# Patient Record
Sex: FEMALE | Race: WHITE | Employment: FULL TIME | URBAN - METROPOLITAN AREA
[De-identification: names, ages, dates, MRNs, and addresses within clinical notes are randomized per-mention and may not be internally consistent; named-entity substitution may affect disease eponyms.]

---

## 2017-01-05 ENCOUNTER — TELEPHONE (OUTPATIENT)
Dept: FAMILY MEDICINE CLINIC | Facility: CLINIC | Age: 32
End: 2017-01-05

## 2017-01-05 DIAGNOSIS — Z11.1 SCREENING-PULMONARY TB: Primary | ICD-10-CM

## 2017-01-05 NOTE — TELEPHONE ENCOUNTER
Patient would like an order to be put in for tb quantiferon gold test. Patient states she needs the order put in ASAP so that she can get it done sometime before the weekend.  Also if it cannot be done within her time frame she would like our office to call

## 2017-02-10 ENCOUNTER — TELEPHONE (OUTPATIENT)
Dept: FAMILY MEDICINE CLINIC | Facility: CLINIC | Age: 32
End: 2017-02-10

## 2017-02-10 NOTE — TELEPHONE ENCOUNTER
In order to complete a Prior Authorization for Nexplanon - we need her current insurance info recorded and a copy of her new insurance card scanned  Msg sent to [employer]

## 2017-02-14 NOTE — TELEPHONE ENCOUNTER
LMOM for pt to call back w/Insurance info # on back of card or fax over card. Need Pre-certification for Nexplanon

## 2017-02-14 NOTE — TELEPHONE ENCOUNTER
Need to call customer Service number on back of insurance card to get Nexplanon pre-certified.    Please get customer service number  Forward to [facility]

## 2017-02-15 NOTE — TELEPHONE ENCOUNTER
LMOM for pt to call back w/Insurance Info # on back of card. Need to get for Pre-Certification for Nexplanon

## 2017-02-22 NOTE — TELEPHONE ENCOUNTER
LMOM for pt to call back and schedule Nexplanon Insertion w/Dr Sandra Yee after 4/88/47 (NO pre-certification needed)

## 2017-02-22 NOTE — TELEPHONE ENCOUNTER
Front Staff  Please assist this patient with scheduling an appointment for Nexplanon insertion with Dr Cally Freeman, please schedule anytime after 2/27/17

## 2017-02-22 NOTE — TELEPHONE ENCOUNTER
[facility] [phone], spoke to Uday rod, stated no pre-certification was needed for Nexplanon or insertion   Nexplanon ordered

## 2017-02-28 ENCOUNTER — OFFICE VISIT (OUTPATIENT)
Dept: FAMILY MEDICINE CLINIC | Facility: CLINIC | Age: 32
End: 2017-02-28

## 2017-02-28 VITALS
DIASTOLIC BLOOD PRESSURE: 76 MMHG | RESPIRATION RATE: 16 BRPM | HEIGHT: 61.75 IN | BODY MASS INDEX: 39.38 KG/M2 | HEART RATE: 72 BPM | WEIGHT: 214 LBS | TEMPERATURE: 98 F | SYSTOLIC BLOOD PRESSURE: 116 MMHG

## 2017-02-28 DIAGNOSIS — Z30.017 INSERTION OF IMPLANTABLE SUBDERMAL CONTRACEPTIVE: ICD-10-CM

## 2017-02-28 DIAGNOSIS — Z30.017 ENCOUNTER FOR INITIAL PRESCRIPTION OF IMPLANTABLE SUBDERMAL CONTRACEPTIVE: Primary | ICD-10-CM

## 2017-02-28 PROCEDURE — 11981 INSERTION DRUG DLVR IMPLANT: CPT | Performed by: FAMILY MEDICINE

## 2017-02-28 RX ORDER — LIDOCAINE HYDROCHLORIDE AND EPINEPHRINE 10; 10 MG/ML; UG/ML
3.5 INJECTION, SOLUTION INFILTRATION; PERINEURAL ONCE
Status: COMPLETED | OUTPATIENT
Start: 2017-02-28 | End: 2017-02-28

## 2017-02-28 RX ADMIN — LIDOCAINE HYDROCHLORIDE AND EPINEPHRINE 3.5 ML: 10; 10 INJECTION, SOLUTION INFILTRATION; PERINEURAL at 13:29:00

## 2017-02-28 NOTE — PROGRESS NOTES
Chief Complaint:  Patient presents with:  Procedure: nexplanon insertion      HPI:  This is a 32year old female patient presenting for Procedure    On OCPs  Would like to switch to nexplanon  Patient denies: HTN/elevated BP, Hx of DVT or equivalent, Hx of Hr Take 1 capsule (20 mg total) by mouth every morning. Disp: 30 capsule Rfl: 0   Amphetamine-Dextroamphet ER 20 MG Oral Capsule SR 24 Hr Take 1 capsule (20 mg total) by mouth every morning.  Disp: 30 capsule Rfl: 0   alprazolam 0.5 MG Oral Tab Take up to 1 Subdermal route once.      Insertion of implantable subdermal contraceptive  -     INSERTION, NON-BIODEGRADABLE DRUG DELIVERY IMPLANT  Procedure Note for Nexplanon Insertion:    Risks, benefits, and alternatives discussed, including complications such as sp

## 2017-05-09 ENCOUNTER — TELEPHONE (OUTPATIENT)
Dept: FAMILY MEDICINE CLINIC | Facility: CLINIC | Age: 32
End: 2017-05-09

## 2017-05-09 NOTE — TELEPHONE ENCOUNTER
Hx of asthma, butno ACT or AAP. LOV this year, but not addressed. Please call to do ACT/AAP or get in woth one of us.  Thanks, Jeffery Persaud MD

## 2017-05-10 NOTE — TELEPHONE ENCOUNTER
Reviewed AAP with pt per phone and she verbalized understanding. ACT 20 flow sheet updated. AAP pended to Dr Kimi Gunn

## 2017-06-02 NOTE — PROGRESS NOTES
CC:  Patient presents with:  Sinus Problem: sinus pressure drainage runny nose started two weeks ago  Chest Congestion: congestion has migrated into chest, pt coughing, increased use of inhaler  Anxiety: refill on xanax  ADD: refill on adderall      HPI: K 24 Hr Take 1 capsule (20 mg total) by mouth every morning. Disp: 30 capsule Rfl: 0   Amphetamine-Dextroamphet ER 20 MG Oral Capsule SR 24 Hr Take 1 capsule (20 mg total) by mouth every morning.  Disp: 30 capsule Rfl: 0   triamcinolone acetonide 0.1 % Extern all lung fields   Neurological: A&O x 3; normal gait and balance  Skin: Warm, dry; there is a 4-5 mm cystic lesion on her right inner thigh near her groin; no erythema or open wound; no drainage or bleeding.   Psychiatric: Normal mood, affect, behavior, aleah Pack 1 kit 0      Sig: As directed. azithromycin (ZITHROMAX Z-LESLY) 250 MG Oral Tab 6 tablet 0      Sig: Take two tablets by mouth today, then one tablet daily.       Albuterol Sulfate HFA (VENTOLIN HFA) 108 (90 Base) MCG/ACT Inhalation Aero Soln 1 [facility]

## 2017-06-03 ENCOUNTER — HOSPITAL ENCOUNTER (OUTPATIENT)
Age: 32
Discharge: HOME | End: 2017-06-03
Attending: FAMILY MEDICINE
Payer: COMMERCIAL

## 2017-06-03 ENCOUNTER — APPOINTMENT (OUTPATIENT)
Dept: GENERAL RADIOLOGY | Age: 32
End: 2017-06-03
Attending: FAMILY MEDICINE
Payer: COMMERCIAL

## 2017-06-03 VITALS
OXYGEN SATURATION: 98 % | HEIGHT: 65 IN | SYSTOLIC BLOOD PRESSURE: 137 MMHG | TEMPERATURE: 97 F | HEART RATE: 63 BPM | BODY MASS INDEX: 28.32 KG/M2 | DIASTOLIC BLOOD PRESSURE: 86 MMHG | RESPIRATION RATE: 18 BRPM | WEIGHT: 170 LBS

## 2017-06-03 DIAGNOSIS — H81.399 VERTIGO, PERIPHERAL, UNSPECIFIED LATERALITY: Primary | ICD-10-CM

## 2017-06-03 DIAGNOSIS — R11.2 NAUSEA AND VOMITING, INTRACTABILITY OF VOMITING NOT SPECIFIED, UNSPECIFIED VOMITING TYPE: ICD-10-CM

## 2017-06-03 DIAGNOSIS — E86.0 DEHYDRATION: ICD-10-CM

## 2017-06-03 DIAGNOSIS — J06.9 ACUTE URI: ICD-10-CM

## 2017-06-03 PROCEDURE — 99204 OFFICE O/P NEW MOD 45 MIN: CPT

## 2017-06-03 PROCEDURE — 99214 OFFICE O/P EST MOD 30 MIN: CPT

## 2017-06-03 PROCEDURE — 81025 URINE PREGNANCY TEST: CPT | Performed by: FAMILY MEDICINE

## 2017-06-03 PROCEDURE — 96374 THER/PROPH/DIAG INJ IV PUSH: CPT

## 2017-06-03 PROCEDURE — 85025 COMPLETE CBC W/AUTO DIFF WBC: CPT | Performed by: FAMILY MEDICINE

## 2017-06-03 PROCEDURE — 71020 XR CHEST PA + LAT CHEST (CPT=71020): CPT | Performed by: FAMILY MEDICINE

## 2017-06-03 PROCEDURE — 80076 HEPATIC FUNCTION PANEL: CPT | Performed by: FAMILY MEDICINE

## 2017-06-03 PROCEDURE — 81002 URINALYSIS NONAUTO W/O SCOPE: CPT | Performed by: FAMILY MEDICINE

## 2017-06-03 PROCEDURE — 80047 BASIC METABLC PNL IONIZED CA: CPT

## 2017-06-03 PROCEDURE — 96361 HYDRATE IV INFUSION ADD-ON: CPT

## 2017-06-03 RX ORDER — MECLIZINE HCL 12.5 MG/1
25 TABLET ORAL 3 TIMES DAILY PRN
Status: DISCONTINUED | OUTPATIENT
Start: 2017-06-03 | End: 2017-06-03

## 2017-06-03 RX ORDER — ONDANSETRON 4 MG/1
4 TABLET, ORALLY DISINTEGRATING ORAL ONCE
Status: COMPLETED | OUTPATIENT
Start: 2017-06-03 | End: 2017-06-03

## 2017-06-03 RX ORDER — ONDANSETRON 4 MG/1
4 TABLET, ORALLY DISINTEGRATING ORAL EVERY 4 HOURS PRN
Qty: 10 TABLET | Refills: 0 | Status: SHIPPED | OUTPATIENT
Start: 2017-06-03 | End: 2017-06-10

## 2017-06-03 RX ORDER — ONDANSETRON 2 MG/ML
4 INJECTION INTRAMUSCULAR; INTRAVENOUS ONCE
Status: COMPLETED | OUTPATIENT
Start: 2017-06-03 | End: 2017-06-03

## 2017-06-03 RX ORDER — MECLIZINE HYDROCHLORIDE 25 MG/1
25 TABLET ORAL 3 TIMES DAILY PRN
Qty: 15 TABLET | Refills: 0 | Status: SHIPPED | OUTPATIENT
Start: 2017-06-03 | End: 2017-06-08

## 2017-06-03 RX ORDER — SODIUM CHLORIDE 9 MG/ML
INJECTION, SOLUTION INTRAVENOUS ONCE
Status: COMPLETED | OUTPATIENT
Start: 2017-06-03 | End: 2017-06-03

## 2017-06-03 RX ORDER — FLUTICASONE PROPIONATE 50 MCG
2 SPRAY, SUSPENSION (ML) NASAL DAILY
Qty: 16 G | Refills: 0 | Status: SHIPPED | OUTPATIENT
Start: 2017-06-03 | End: 2017-07-03

## 2017-06-03 NOTE — ED PROVIDER NOTES
Patient Seen [facility]: [facility]    History   Patient presents with:  Dizziness (neurologic)    Stated Complaint: check up     HPI    Stefani Be is a 28year old female presents with chief complaint of dizziness that this morning. Amphetamine-Dextroamphet ER 20 MG Oral Capsule SR 24 Hr,  Take 1 capsule (20 mg total) by mouth every morning. Amphetamine-Dextroamphet ER 20 MG Oral Capsule SR 24 Hr,  Take 1 capsule (20 mg total) by mouth every morning.    Amphetamine-Dextroa and well-nourished. HENT:   Head: Normocephalic and atraumatic. Mouth/Throat: No oropharyngeal exudate. Fluid behind bilateral tympanic membrane with some loss of landmarks.   Nasal congestion bilaterally   Eyes: Conjunctivae and EOM are normal. Pupil meclizine 25 mg with relief in symptoms of dizziness. Urinalysis: trace ketones, urine pregnancy negative   Patient will be discharged home on Zofran, meclizine. Recommend to use Flonase, continue prednisone, that antibiotics prescribed by PCP.   Push flu
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
## 2017-06-07 ENCOUNTER — OFFICE VISIT (OUTPATIENT)
Dept: FAMILY MEDICINE CLINIC | Facility: CLINIC | Age: 32
End: 2017-06-07

## 2017-06-07 VITALS
BODY MASS INDEX: 35 KG/M2 | HEART RATE: 96 BPM | RESPIRATION RATE: 16 BRPM | SYSTOLIC BLOOD PRESSURE: 122 MMHG | DIASTOLIC BLOOD PRESSURE: 82 MMHG | TEMPERATURE: 98 F | WEIGHT: 209 LBS

## 2017-06-07 DIAGNOSIS — R42 DIZZINESS: Primary | ICD-10-CM

## 2017-06-07 PROCEDURE — 99214 OFFICE O/P EST MOD 30 MIN: CPT | Performed by: PHYSICIAN ASSISTANT

## 2017-06-07 RX ORDER — MECLIZINE HYDROCHLORIDE 25 MG/1
25 TABLET ORAL 3 TIMES DAILY PRN
Qty: 30 TABLET | Refills: 0 | Status: ON HOLD | OUTPATIENT
Start: 2017-06-07 | End: 2017-07-11
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
## 2017-06-07 NOTE — PROGRESS NOTES
CC:  Patient presents with:  Urgent Care F/u: imm care visit, pt dx vertigo, nausea is gone, but diziness is constant, pt still cannot drive      HPI: Hilary Acosta presents for an ER F/U for dizziness with nausea.  She states her nausea is gone, and her dizzines Inhale 2 puffs into the lungs every 4 (four) hours as needed for Wheezing or Shortness of Breath. Disp: 1 Inhaler Rfl: 1   FLUoxetine HCl (PROZAC) 10 MG Oral Cap Take 1 capsule (10 mg total) by mouth every morning.  Disp: 30 capsule Rfl: 5   Amphetamine-Dex visible   Nose: No edema, bleeding, or rhinorrhea; nares patent; no septal deviation   Eyes: PERRLA; EOMIs; mild nystagmus noted  Neck: Normal ROM; no deformities or tenderness to palpation; supple neck; no thyromegaly   Cardiovascular: RRR, no murmurs, ru

## 2017-07-11 ENCOUNTER — HOSPITAL ENCOUNTER (INPATIENT)
Facility: HOSPITAL | Age: 32
LOS: 4 days | Discharge: NURSING, REHAB OR LONG-TERM CARE | DRG: 176 | End: 2017-07-15
Attending: HOSPITALIST | Admitting: HOSPITALIST
Payer: COMMERCIAL

## 2017-07-11 ENCOUNTER — APPOINTMENT (OUTPATIENT)
Dept: GENERAL RADIOLOGY | Facility: HOSPITAL | Age: 32
DRG: 176 | End: 2017-07-11
Attending: INTERNAL MEDICINE
Payer: COMMERCIAL

## 2017-07-11 PROBLEM — S88.111A AMPUTATION OF RIGHT LOWER EXTREMITY BELOW KNEE (HCC): Status: ACTIVE | Noted: 2017-07-11

## 2017-07-11 PROBLEM — S78.111A ABOVE KNEE AMPUTATION OF RIGHT LOWER EXTREMITY (HCC): Status: ACTIVE | Noted: 2017-07-11

## 2017-07-11 PROBLEM — S88.111A AMPUTATION OF RIGHT LOWER EXTREMITY BELOW KNEE (HCC): Status: RESOLVED | Noted: 2017-07-11 | Resolved: 2017-07-11

## 2017-07-11 PROBLEM — Z89.511 HISTORY OF RIGHT BELOW KNEE AMPUTATION (HCC): Status: ACTIVE | Noted: 2017-07-11

## 2017-07-11 PROBLEM — I26.99 ACUTE PULMONARY EMBOLISM (HCC): Status: ACTIVE | Noted: 2017-07-11

## 2017-07-11 PROBLEM — Z89.511 HISTORY OF RIGHT BELOW KNEE AMPUTATION (HCC): Status: RESOLVED | Noted: 2017-07-11 | Resolved: 2017-07-11

## 2017-07-11 LAB
ALBUMIN SERPL-MCNC: 2.4 G/DL (ref 3.5–4.8)
ALP LIVER SERPL-CCNC: 55 U/L (ref 37–98)
ALT SERPL-CCNC: 127 U/L (ref 14–54)
AST SERPL-CCNC: 101 U/L (ref 15–41)
BASOPHILS # BLD AUTO: 0.03 X10(3) UL (ref 0–0.1)
BASOPHILS NFR BLD AUTO: 0.4 %
BILIRUB SERPL-MCNC: 1 MG/DL (ref 0.1–2)
BUN BLD-MCNC: 2 MG/DL (ref 8–20)
CALCIUM BLD-MCNC: 8.2 MG/DL (ref 8.3–10.3)
CHLORIDE: 101 MMOL/L (ref 101–111)
CO2: 31 MMOL/L (ref 22–32)
CREAT BLD-MCNC: 0.38 MG/DL (ref 0.55–1.02)
EOSINOPHIL # BLD AUTO: 0.09 X10(3) UL (ref 0–0.3)
EOSINOPHIL NFR BLD AUTO: 1.3 %
ERYTHROCYTE [DISTWIDTH] IN BLOOD BY AUTOMATED COUNT: 14.1 % (ref 11.5–16)
GLUCOSE BLD-MCNC: 87 MG/DL (ref 70–99)
HCT VFR BLD AUTO: 29.7 % (ref 34–50)
HGB BLD-MCNC: 9.5 G/DL (ref 12–16)
IMMATURE GRANULOCYTE COUNT: 0.03 X10(3) UL (ref 0–1)
IMMATURE GRANULOCYTE RATIO %: 0.4 %
LYMPHOCYTES # BLD AUTO: 1.15 X10(3) UL (ref 0.9–4)
LYMPHOCYTES NFR BLD AUTO: 16.7 %
M PROTEIN MFR SERPL ELPH: 5.3 G/DL (ref 6.1–8.3)
MCH RBC QN AUTO: 30 PG (ref 27–33.2)
MCHC RBC AUTO-ENTMCNC: 32 G/DL (ref 31–37)
MCV RBC AUTO: 93.7 FL (ref 81–100)
MONOCYTES # BLD AUTO: 0.54 X10(3) UL (ref 0.1–0.6)
MONOCYTES NFR BLD AUTO: 7.9 %
NEUTROPHIL ABS PRELIM: 5.03 X10 (3) UL (ref 1.3–6.7)
NEUTROPHILS # BLD AUTO: 5.03 X10(3) UL (ref 1.3–6.7)
NEUTROPHILS NFR BLD AUTO: 73.3 %
PLATELET # BLD AUTO: 159 10(3)UL (ref 150–450)
POTASSIUM SERPL-SCNC: 3.5 MMOL/L (ref 3.6–5.1)
RBC # BLD AUTO: 3.17 X10(6)UL (ref 3.8–5.1)
RED CELL DISTRIBUTION WIDTH-SD: 47.8 FL (ref 35.1–46.3)
SODIUM SERPL-SCNC: 139 MMOL/L (ref 136–144)
WBC # BLD AUTO: 6.9 X10(3) UL (ref 4–13)

## 2017-07-11 PROCEDURE — 80053 COMPREHEN METABOLIC PANEL: CPT | Performed by: HOSPITALIST

## 2017-07-11 PROCEDURE — 99223 1ST HOSP IP/OBS HIGH 75: CPT | Performed by: HOSPITALIST

## 2017-07-11 PROCEDURE — 05H633Z INSERTION OF INFUSION DEVICE INTO LEFT SUBCLAVIAN VEIN, PERCUTANEOUS APPROACH: ICD-10-PCS | Performed by: INTERNAL MEDICINE

## 2017-07-11 PROCEDURE — 71010 XR CHEST AP PORTABLE  (CPT=71010): CPT | Performed by: INTERNAL MEDICINE

## 2017-07-11 RX ORDER — LIDOCAINE 50 MG/G
1-2 PATCH TOPICAL EVERY 24 HOURS
Status: DISCONTINUED | OUTPATIENT
Start: 2017-07-11 | End: 2017-07-11 | Stop reason: SDUPTHER

## 2017-07-11 RX ORDER — FLUOXETINE 10 MG/1
10 TABLET, FILM COATED ORAL EVERY MORNING
Status: DISCONTINUED | OUTPATIENT
Start: 2017-07-11 | End: 2017-07-13

## 2017-07-11 RX ORDER — HYDROMORPHONE HYDROCHLORIDE 1 MG/ML
0.5 INJECTION, SOLUTION INTRAMUSCULAR; INTRAVENOUS; SUBCUTANEOUS EVERY 2 HOUR PRN
Status: ON HOLD | COMMUNITY
End: 2017-07-14

## 2017-07-11 RX ORDER — ALBUTEROL SULFATE 90 UG/1
2 AEROSOL, METERED RESPIRATORY (INHALATION) EVERY 4 HOURS PRN
Status: DISCONTINUED | OUTPATIENT
Start: 2017-07-11 | End: 2017-07-15

## 2017-07-11 RX ORDER — CYCLOBENZAPRINE HCL 10 MG
10 TABLET ORAL EVERY 8 HOURS PRN
Status: ON HOLD | COMMUNITY
End: 2017-07-14

## 2017-07-11 RX ORDER — ALBUTEROL SULFATE 2.5 MG/3ML
2.5 SOLUTION RESPIRATORY (INHALATION) EVERY 6 HOURS
Status: DISCONTINUED | OUTPATIENT
Start: 2017-07-11 | End: 2017-07-11

## 2017-07-11 RX ORDER — DOCUSATE SODIUM 100 MG/1
100 CAPSULE, LIQUID FILLED ORAL 2 TIMES DAILY
COMMUNITY
End: 2017-08-31 | Stop reason: ALTCHOICE

## 2017-07-11 RX ORDER — BISACODYL 10 MG
10 SUPPOSITORY, RECTAL RECTAL DAILY PRN
Status: DISCONTINUED | OUTPATIENT
Start: 2017-07-11 | End: 2017-07-15

## 2017-07-11 RX ORDER — HYDROMORPHONE HYDROCHLORIDE 1 MG/ML
0.5 INJECTION, SOLUTION INTRAMUSCULAR; INTRAVENOUS; SUBCUTANEOUS EVERY 2 HOUR PRN
Status: DISCONTINUED | OUTPATIENT
Start: 2017-07-11 | End: 2017-07-12

## 2017-07-11 RX ORDER — ALBUTEROL SULFATE 2.5 MG/3ML
2.5 SOLUTION RESPIRATORY (INHALATION) EVERY 6 HOURS
Status: DISCONTINUED | OUTPATIENT
Start: 2017-07-12 | End: 2017-07-13

## 2017-07-11 RX ORDER — ONDANSETRON 2 MG/ML
4 INJECTION INTRAMUSCULAR; INTRAVENOUS EVERY 6 HOURS PRN
Status: ON HOLD | COMMUNITY
End: 2017-07-14

## 2017-07-11 RX ORDER — DOCUSATE SODIUM 100 MG/1
100 CAPSULE, LIQUID FILLED ORAL 2 TIMES DAILY
Status: DISCONTINUED | OUTPATIENT
Start: 2017-07-11 | End: 2017-07-15

## 2017-07-11 RX ORDER — ALBUTEROL SULFATE 2.5 MG/3ML
2.5 SOLUTION RESPIRATORY (INHALATION) EVERY 6 HOURS
COMMUNITY
End: 2017-08-07

## 2017-07-11 RX ORDER — HYDROCODONE BITARTRATE AND ACETAMINOPHEN 5; 325 MG/1; MG/1
1 TABLET ORAL EVERY 6 HOURS PRN
Status: DISCONTINUED | OUTPATIENT
Start: 2017-07-11 | End: 2017-07-12

## 2017-07-11 RX ORDER — ACETAMINOPHEN 325 MG/1
650 TABLET ORAL EVERY 6 HOURS PRN
Status: DISCONTINUED | OUTPATIENT
Start: 2017-07-11 | End: 2017-07-15

## 2017-07-11 RX ORDER — DEXTROAMPHETAMINE SACCHARATE, AMPHETAMINE ASPARTATE, DEXTROAMPHETAMINE SULFATE AND AMPHETAMINE SULFATE 1.25; 1.25; 1.25; 1.25 MG/1; MG/1; MG/1; MG/1
2.5 TABLET ORAL
Status: DISCONTINUED | OUTPATIENT
Start: 2017-07-11 | End: 2017-07-11

## 2017-07-11 RX ORDER — BISACODYL 10 MG
10 SUPPOSITORY, RECTAL RECTAL DAILY PRN
COMMUNITY
End: 2017-08-07 | Stop reason: ALTCHOICE

## 2017-07-11 RX ORDER — ALPRAZOLAM 0.5 MG/1
0.5 TABLET ORAL 2 TIMES DAILY PRN
Status: DISCONTINUED | OUTPATIENT
Start: 2017-07-11 | End: 2017-07-15

## 2017-07-11 RX ORDER — LIDOCAINE 50 MG/G
1 PATCH TOPICAL EVERY 24 HOURS
Status: DISCONTINUED | OUTPATIENT
Start: 2017-07-11 | End: 2017-07-15

## 2017-07-11 RX ORDER — LIDOCAINE 50 MG/G
2 PATCH TOPICAL EVERY 24 HOURS
Status: DISCONTINUED | OUTPATIENT
Start: 2017-07-11 | End: 2017-07-15

## 2017-07-11 RX ORDER — ACETAMINOPHEN 325 MG/1
650 TABLET ORAL EVERY 6 HOURS PRN
COMMUNITY
End: 2017-08-07

## 2017-07-11 RX ORDER — IBUPROFEN 400 MG/1
400 TABLET ORAL EVERY 6 HOURS PRN
Status: DISCONTINUED | OUTPATIENT
Start: 2017-07-11 | End: 2017-07-12

## 2017-07-11 RX ORDER — DEXTROAMPHETAMINE SACCHARATE, AMPHETAMINE ASPARTATE, DEXTROAMPHETAMINE SULFATE AND AMPHETAMINE SULFATE 2.5; 2.5; 2.5; 2.5 MG/1; MG/1; MG/1; MG/1
10 TABLET ORAL
Status: DISCONTINUED | OUTPATIENT
Start: 2017-07-11 | End: 2017-07-15

## 2017-07-11 RX ORDER — LIDOCAINE 50 MG/G
1-2 PATCH TOPICAL EVERY 24 HOURS
COMMUNITY
End: 2017-08-07 | Stop reason: ALTCHOICE

## 2017-07-11 RX ORDER — ALBUTEROL SULFATE 2.5 MG/3ML
2.5 SOLUTION RESPIRATORY (INHALATION) AS NEEDED
Status: DISCONTINUED | OUTPATIENT
Start: 2017-07-11 | End: 2017-07-15

## 2017-07-11 RX ORDER — SODIUM CHLORIDE 0.9 % (FLUSH) 0.9 %
10 SYRINGE (ML) INJECTION EVERY 12 HOURS
Status: DISCONTINUED | OUTPATIENT
Start: 2017-07-11 | End: 2017-07-15

## 2017-07-11 RX ORDER — HYDROMORPHONE HYDROCHLORIDE 2 MG/1
2 TABLET ORAL 2 TIMES DAILY PRN
Status: DISCONTINUED | OUTPATIENT
Start: 2017-07-11 | End: 2017-07-12

## 2017-07-11 RX ORDER — CYCLOBENZAPRINE HCL 10 MG
10 TABLET ORAL EVERY 8 HOURS PRN
Status: DISCONTINUED | OUTPATIENT
Start: 2017-07-11 | End: 2017-07-12

## 2017-07-11 RX ORDER — FAMOTIDINE 20 MG/1
20 TABLET ORAL 2 TIMES DAILY
Status: DISCONTINUED | OUTPATIENT
Start: 2017-07-11 | End: 2017-07-15

## 2017-07-11 RX ORDER — ALPRAZOLAM 0.5 MG/1
0.5 TABLET ORAL NIGHTLY PRN
Status: DISCONTINUED | OUTPATIENT
Start: 2017-07-11 | End: 2017-07-11

## 2017-07-11 RX ORDER — HYDROCODONE BITARTRATE AND ACETAMINOPHEN 5; 325 MG/1; MG/1
1 TABLET ORAL EVERY 6 HOURS PRN
Status: ON HOLD | COMMUNITY
End: 2017-07-14

## 2017-07-11 RX ORDER — IBUPROFEN 600 MG/1
600 TABLET ORAL EVERY 6 HOURS PRN
Status: DISCONTINUED | OUTPATIENT
Start: 2017-07-11 | End: 2017-07-12

## 2017-07-11 RX ORDER — ONDANSETRON 2 MG/ML
4 INJECTION INTRAMUSCULAR; INTRAVENOUS EVERY 6 HOURS PRN
Status: DISCONTINUED | OUTPATIENT
Start: 2017-07-11 | End: 2017-07-12

## 2017-07-11 RX ORDER — FAMOTIDINE 20 MG/1
20 TABLET ORAL 2 TIMES DAILY
COMMUNITY
End: 2017-08-07 | Stop reason: ALTCHOICE

## 2017-07-11 RX ORDER — SENNOSIDES 8.6 MG
34.4 TABLET ORAL NIGHTLY PRN
COMMUNITY
End: 2017-11-17 | Stop reason: ALTCHOICE

## 2017-07-11 RX ORDER — ALBUTEROL SULFATE 2.5 MG/3ML
2.5 SOLUTION RESPIRATORY (INHALATION) AS NEEDED
COMMUNITY
End: 2021-05-04

## 2017-07-11 RX ORDER — SENNOSIDES 8.6 MG
34.4 TABLET ORAL NIGHTLY PRN
Status: DISCONTINUED | OUTPATIENT
Start: 2017-07-11 | End: 2017-07-15

## 2017-07-11 NOTE — PROGRESS NOTES
Patient off floor . Gabriele Leos Will see tomorrow or Thursday.   Recommend daily dry dressing changes

## 2017-07-11 NOTE — CONSULTS
17 [facility] Patient Status:  Inpatient    6/3/1985 MRN [id number]   [facility] 7NE-A Attending Rojelio Koehler MD   Hosp Day # 0 PCP Amee Blanchard MD     Date of Admission: 2017 12:17 PM  Admission Diagnosis: pu Taking for the 7/11/17 encounter [facility] Encounter):  acetaminophen 325 MG Oral Tab Take 650 mg by mouth every 6 (six) hours as needed for Pain.  Disp:  Rfl:    albuterol sulfate (2.5 MG/3ML) 0.083% Inhalation Nebu Soln Take 2.5 mg by nebulization every 6 BID  •  Cyclobenzaprine HCl (cyclobenzaprine) tab 10 mg, 10 mg, Oral, Q8H PRN  •  bisacodyl (DULCOLAX) rectal suppository 10 mg, 10 mg, Rectal, Daily PRN  •  acetaminophen (TYLENOL) tab 650 mg, 650 mg, Oral, Q6H PRN  •  albuterol sulfate (VENTOLIN) (2.5 MG Normocephalic, without obvious abnormality, atraumatic  Neck: no adenopathy, no carotid bruit, no JVD and supple, symmetrical, trachea midline  Back: symmetric, no curvature. ROM normal. No CVA tenderness.   Lungs: diminished breath sounds bibasilar otherwi this point. Does not have flail chest  -avoid using crutches to ambulate to avoid further injury for now  5. Pleural effusions- likely parapneumonic in assn with above  -repeat CXR here to assess for interval worsening  6. Asthma- mild intermittent.  No exa

## 2017-07-11 NOTE — H&P
[facility]  History and Physical     Rhodadelfina Pacheco Patient Status:  Inpatient    6/3/1985 MRN [id number]   [facility] 7NE-A Attending My Carranza MD   Hosp Day # 0 PCP Juan Diego Gay MD     Chief Complaint: s/p right AKA, PE Alcohol and Other Disorders Associated Paternal Grandfather    • Anxiety Paternal Cousin Male        Allergies: No Known Allergies    Medications:    No current facility-administered medications on file prior to encounter.    Current Outpatient Prescription rebound, guarding or organomegaly. Neurologic: No focal neurological deficits. CNII-XII grossly intact. Musculoskeletal: Moves all extremities. Extremities: Right BKA  Integument: No rashes or lesions. Psychiatric: Appropriate mood and affect.       Wayne Barnhart

## 2017-07-12 LAB
ALBUMIN SERPL-MCNC: 2.4 G/DL (ref 3.5–4.8)
ALP LIVER SERPL-CCNC: 70 U/L (ref 37–98)
ALT SERPL-CCNC: 178 U/L (ref 14–54)
AST SERPL-CCNC: 189 U/L (ref 15–41)
BASOPHILS # BLD AUTO: 0.02 X10(3) UL (ref 0–0.1)
BASOPHILS NFR BLD AUTO: 0.4 %
BILIRUB SERPL-MCNC: 0.9 MG/DL (ref 0.1–2)
BUN BLD-MCNC: 3 MG/DL (ref 8–20)
CALCIUM BLD-MCNC: 7.9 MG/DL (ref 8.3–10.3)
CHLORIDE: 102 MMOL/L (ref 101–111)
CO2: 30 MMOL/L (ref 22–32)
CREAT BLD-MCNC: 0.42 MG/DL (ref 0.55–1.02)
EOSINOPHIL # BLD AUTO: 0.12 X10(3) UL (ref 0–0.3)
EOSINOPHIL NFR BLD AUTO: 2.2 %
ERYTHROCYTE [DISTWIDTH] IN BLOOD BY AUTOMATED COUNT: 14.2 % (ref 11.5–16)
GLUCOSE BLD-MCNC: 91 MG/DL (ref 70–99)
HCT VFR BLD AUTO: 30.6 % (ref 34–50)
HGB BLD-MCNC: 9.9 G/DL (ref 12–16)
IMMATURE GRANULOCYTE COUNT: 0.01 X10(3) UL (ref 0–1)
IMMATURE GRANULOCYTE RATIO %: 0.2 %
LYMPHOCYTES # BLD AUTO: 1.57 X10(3) UL (ref 0.9–4)
LYMPHOCYTES NFR BLD AUTO: 28.4 %
M PROTEIN MFR SERPL ELPH: 5.5 G/DL (ref 6.1–8.3)
MCH RBC QN AUTO: 30.3 PG (ref 27–33.2)
MCHC RBC AUTO-ENTMCNC: 32.4 G/DL (ref 31–37)
MCV RBC AUTO: 93.6 FL (ref 81–100)
MONOCYTES # BLD AUTO: 0.46 X10(3) UL (ref 0.1–0.6)
MONOCYTES NFR BLD AUTO: 8.3 %
NEUTROPHIL ABS PRELIM: 3.34 X10 (3) UL (ref 1.3–6.7)
NEUTROPHILS # BLD AUTO: 3.34 X10(3) UL (ref 1.3–6.7)
NEUTROPHILS NFR BLD AUTO: 60.5 %
PLATELET # BLD AUTO: 168 10(3)UL (ref 150–450)
POTASSIUM SERPL-SCNC: 2.9 MMOL/L (ref 3.6–5.1)
RBC # BLD AUTO: 3.27 X10(6)UL (ref 3.8–5.1)
RED CELL DISTRIBUTION WIDTH-SD: 47.8 FL (ref 35.1–46.3)
SODIUM SERPL-SCNC: 140 MMOL/L (ref 136–144)
WBC # BLD AUTO: 5.5 X10(3) UL (ref 4–13)

## 2017-07-12 PROCEDURE — 99233 SBSQ HOSP IP/OBS HIGH 50: CPT | Performed by: HOSPITALIST

## 2017-07-12 RX ORDER — TIZANIDINE 4 MG/1
4 TABLET ORAL EVERY 6 HOURS PRN
Status: DISCONTINUED | OUTPATIENT
Start: 2017-07-12 | End: 2017-07-15

## 2017-07-12 RX ORDER — POTASSIUM CHLORIDE 20 MEQ/1
40 TABLET, EXTENDED RELEASE ORAL EVERY 4 HOURS
Status: COMPLETED | OUTPATIENT
Start: 2017-07-12 | End: 2017-07-12

## 2017-07-12 RX ORDER — ONDANSETRON 2 MG/ML
4 INJECTION INTRAMUSCULAR; INTRAVENOUS EVERY 6 HOURS PRN
Status: DISCONTINUED | OUTPATIENT
Start: 2017-07-12 | End: 2017-07-13

## 2017-07-12 RX ORDER — OXYCODONE AND ACETAMINOPHEN 10; 325 MG/1; MG/1
1 TABLET ORAL EVERY 4 HOURS PRN
Status: DISCONTINUED | OUTPATIENT
Start: 2017-07-12 | End: 2017-07-12

## 2017-07-12 RX ORDER — PREGABALIN 25 MG/1
25 CAPSULE ORAL 2 TIMES DAILY
Status: DISCONTINUED | OUTPATIENT
Start: 2017-07-12 | End: 2017-07-15

## 2017-07-12 RX ORDER — NALBUPHINE HCL 10 MG/ML
2.5 AMPUL (ML) INJECTION EVERY 4 HOURS PRN
Status: DISCONTINUED | OUTPATIENT
Start: 2017-07-12 | End: 2017-07-13

## 2017-07-12 RX ORDER — NALOXONE HYDROCHLORIDE 0.4 MG/ML
0.08 INJECTION, SOLUTION INTRAMUSCULAR; INTRAVENOUS; SUBCUTANEOUS
Status: DISCONTINUED | OUTPATIENT
Start: 2017-07-12 | End: 2017-07-13

## 2017-07-12 RX ORDER — OXYCODONE AND ACETAMINOPHEN 10; 325 MG/1; MG/1
2 TABLET ORAL EVERY 4 HOURS PRN
Status: DISCONTINUED | OUTPATIENT
Start: 2017-07-12 | End: 2017-07-12

## 2017-07-12 RX ORDER — DIPHENHYDRAMINE HYDROCHLORIDE 50 MG/ML
12.5 INJECTION INTRAMUSCULAR; INTRAVENOUS EVERY 4 HOURS PRN
Status: DISCONTINUED | OUTPATIENT
Start: 2017-07-12 | End: 2017-07-13

## 2017-07-12 RX ORDER — OXYCODONE AND ACETAMINOPHEN 10; 325 MG/1; MG/1
2 TABLET ORAL
Status: DISCONTINUED | OUTPATIENT
Start: 2017-07-12 | End: 2017-07-15

## 2017-07-12 RX ORDER — HYDROMORPHONE HYDROCHLORIDE 1 MG/ML
0.5 INJECTION, SOLUTION INTRAMUSCULAR; INTRAVENOUS; SUBCUTANEOUS EVERY 30 MIN PRN
Status: DISCONTINUED | OUTPATIENT
Start: 2017-07-12 | End: 2017-07-13
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
## 2017-07-12 NOTE — PROGRESS NOTES
17 [facility] Patient Status:  Inpatient    6/3/1985 MRN [id number]   [facility] 7NE-A Attending Kelsey Poe MD   Hosp Day # 1 PCP Jessie Shah MD     Pulm / Critical Care Progress Note     S: pt reports ongoing p S1S2, no murmur. Abdomen: soft, non-tender, non-distended, positive BS.    Extremity: s/p RLE AKA       Recent Labs   Lab  07/11/17   1456  07/12/17   0527   WBC  6.9  5.5   HGB  9.5*  9.9*   HCT  29.7*  30.6*   PLT  159.0  168.0     No results for input(

## 2017-07-12 NOTE — PLAN OF CARE
Patient A/OX4 cooperative with care  [facility] for comfort, c/o of chest pain  Pain uncontrolled this morning, pain service consulted  Dilaudid PCA and scheduled Percocet - will monitor for lethargy and pain management  Appetite poor  Potassium replaced per pr

## 2017-07-12 NOTE — PLAN OF CARE
AOx4. C/o pain to Right AKA site. Dilaudid IV given every 2 hours for pain control. Family/friend assisted pt to use bedside commode. Plan of care discussed with pt. Call light within reach.

## 2017-07-12 NOTE — CONSULTS
Pharmacy Note:   Clarification of Medication(s) for Patient on PCA/PCEA. Carie Valenzuela is a 28year old female started on PCA by Dr. Azam Nolen.   Pharmacy was consulted to review medication profile and to discontinue previously ordered narcotics and sed

## 2017-07-12 NOTE — CONSULTS
[facility]  Report of Consultation    Melody Raza Patient Status:  Inpatient    6/3/1985 MRN [id number]   [facility] 7NE-A Attending Maurice Bowser MD   Hosp Day # 1 PCP Gaby Kitchen MD     Reason for Consultation:  Right AKA BID with meals  •  HYDROmorphone (DILAUDID) 0.2 mg/ml PCA infusion, , Intravenous, Continuous  •  TiZANidine HCl (ZANAFLEX) tab 4 mg, 4 mg, Oral, Q6H PRN  •  [DISCONTINUED] oxyCODONE-acetaminophen (PERCOCET)  MG per tab 1 tablet, 1 tablet, Oral, Q4H AKA.  Serous drainage. No erythema. Left ankle swollen. Medial and lateral tenderness. No instability  Skin: Normal texture and turgor. Neurologic: This patient is alert and orientated x 3. Speech fluent. Able to follow simple commands.   Face is symm

## 2017-07-12 NOTE — PROGRESS NOTES
[facility]  Progress Note     Aj Ta Patient Status:  Inpatient    6/3/1985 MRN [id number]   [facility] 7NE-A Attending Darshan Nash MD   Hosp Day # 1 PCP Avinash Millard MD     Chief Complaint: s/p right AKA    S: Patien patch Transdermal Q24H   • lidocaine  1 patch Transdermal Q24H    Or   • lidocaine  2 patch Transdermal Q24H   • Normal Saline Flush  10 mL Intravenous Q12H   • albuterol sulfate  2.5 mg Nebulization Q6H       ASSESSMENT / PLAN:     1.  S/p recent right leg

## 2017-07-12 NOTE — PROGRESS NOTES
[facility]  Report of Consultation    Sharmilafiordaliza Smith Patient Status:  Inpatient    6/3/1985 MRN [id number]   [facility] 7NE-A Attending Robbin Burleson MD   Hosp Day # 1 PCP Sima Gillette MD     Date of Admission:  2017  Date of mg, Intravenous, Q5 Min PRN  •  DiphenhydrAMINE HCl (BENADRYL) injection 12.5 mg, 12.5 mg, Intravenous, Q4H PRN  •  Nalbuphine HCl (NUBAIN) injection 2.5 mg, 2.5 mg, Intravenous, Q4H PRN  •  HYDROmorphone HCl PF (DILAUDID) 1 MG/ML injection 0.5 mg, 0.5 mg, in HPI. Physical Exam:  Blood pressure 145/88, pulse 83, temperature 98.4 °F (36.9 °C), temperature source Oral, resp. rate 18, SpO2 99 %.     Laboratory Data:    Lab Results  Component Value Date   WBC 5.5 07/12/2017   HGB 9.9 07/12/2017   HCT 30.6 07/1

## 2017-07-13 LAB
ALBUMIN SERPL-MCNC: 2.5 G/DL (ref 3.5–4.8)
ALP LIVER SERPL-CCNC: 87 U/L (ref 37–98)
ALT SERPL-CCNC: 337 U/L (ref 14–54)
AST SERPL-CCNC: 320 U/L (ref 15–41)
BILIRUB SERPL-MCNC: 0.5 MG/DL (ref 0.1–2)
BUN BLD-MCNC: 5 MG/DL (ref 8–20)
CALCIUM BLD-MCNC: 8.2 MG/DL (ref 8.3–10.3)
CHLORIDE: 107 MMOL/L (ref 101–111)
CO2: 26 MMOL/L (ref 22–32)
CREAT BLD-MCNC: 0.54 MG/DL (ref 0.55–1.02)
GLUCOSE BLD-MCNC: 101 MG/DL (ref 70–99)
M PROTEIN MFR SERPL ELPH: 5.6 G/DL (ref 6.1–8.3)
POTASSIUM SERPL-SCNC: 3.4 MMOL/L (ref 3.6–5.1)
POTASSIUM SERPL-SCNC: 3.5 MMOL/L (ref 3.6–5.1)
SODIUM SERPL-SCNC: 142 MMOL/L (ref 136–144)

## 2017-07-13 PROCEDURE — 90792 PSYCH DIAG EVAL W/MED SRVCS: CPT | Performed by: OTHER

## 2017-07-13 PROCEDURE — 99233 SBSQ HOSP IP/OBS HIGH 50: CPT | Performed by: HOSPITALIST

## 2017-07-13 RX ORDER — HYDROMORPHONE HYDROCHLORIDE 1 MG/ML
0.5 INJECTION, SOLUTION INTRAMUSCULAR; INTRAVENOUS; SUBCUTANEOUS EVERY 2 HOUR PRN
Status: DISCONTINUED | OUTPATIENT
Start: 2017-07-13 | End: 2017-07-15

## 2017-07-13 RX ORDER — FLUOXETINE 10 MG/1
20 TABLET, FILM COATED ORAL EVERY MORNING
Status: DISCONTINUED | OUTPATIENT
Start: 2017-07-14 | End: 2017-07-15

## 2017-07-13 RX ORDER — FLUOXETINE 10 MG/1
10 TABLET, FILM COATED ORAL ONCE
Status: COMPLETED | OUTPATIENT
Start: 2017-07-13 | End: 2017-07-13

## 2017-07-13 RX ORDER — POTASSIUM CHLORIDE 20 MEQ/1
40 TABLET, EXTENDED RELEASE ORAL EVERY 4 HOURS
Status: COMPLETED | OUTPATIENT
Start: 2017-07-13 | End: 2017-07-13

## 2017-07-13 NOTE — PROGRESS NOTES
Pain Service  Patient reports her pain is controlled with Percocet, Lyrica, Zanaflex and Lidoderm patches to chest.  Plan for transfer to rehab later today or tomorrow  No further recommendations  Will sign off  Vishnu Wagner RN

## 2017-07-13 NOTE — PROGRESS NOTES
[facility]  Progress Note     Patricia Haynes Patient Status:  Inpatient    6/3/1985 MRN [id number]   [facility] 7NE-A Attending Hazel Jones MD   Hosp Day # 2 PCP Vick Kelley MD     Chief Complaint: s/p right AKA    S: Patien per day   • pregabalin  25 mg Oral BID   • FLUoxetine HCl  10 mg Oral QAM   • famoTIDine  20 mg Oral BID   • docusate sodium  100 mg Oral BID   • amphetamine-dextroamphetamine  10 mg Oral BID AC   • nicotine  1 patch Transdermal Q24H   • lidocaine  1 patch

## 2017-07-13 NOTE — CM/SW NOTE
Pt has decided to go to [facility].   [facility] will get insurance Shabnam Sitter - will need insurance auth before pt can go to [facility].
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
## 2017-07-13 NOTE — OCCUPATIONAL THERAPY NOTE
OCCUPATIONAL THERAPY EVALUATION - INPATIENT     Room Number: 3288/5727-W  Evaluation Date: 7/13/2017  Type of Evaluation: Initial  Presenting Problem: R AKA, sternal fracture, L ankle sprain, rib fractures    Physician Order: IP Consult to Occupational The ASSESSMENT  Ratin  Location: sternum, ribs ankle  Management Techniques:  Body mechanics;Breathing techniques;Relaxation;Repositioning    COGNITION  Overall Cognitive Status:  WFL - within functional limits  Awareness of Errors:  assistance required to ASSESSMENT  Supine to Sit : Supervision  Sit to Stand: Minimum assistance    Skilled Therapy Provided: Pt was in a semisupine position on arrival and ready and willing to participate in therapy.  Pt was educated on sternal fracture precautions and importanc prior level of function. On d/c Acute rehab recommended to increase functional mobility for safe d/c home. OT Discharge Recommendations: Acute rehabilitation  OT Device Recommendations: TBD    PLAN  OT Treatment Plan: Balance activities; Energy conserv

## 2017-07-13 NOTE — PROGRESS NOTES
07/12/17 1913   Clinical Encounter Type   Visited With Patient and family together;Patient   Routine Visit Introduction   Continue Visiting Yes   Surgical Visit Post-op   Patient's Supportive Strategies/Resources Patient has spiritual strength, open rel

## 2017-07-13 NOTE — PLAN OF CARE
AOx4. Family/Friend at bedside. Pain is controlled with Percocet. Pt is able to pivot from bed to bedside commode. Pt request to go to [facility];  to follow up. Plan of care discussed with pt. Call light within reach.     Impaired Functional Mobi

## 2017-07-13 NOTE — PLAN OF CARE
Impaired Functional Mobility    • Achieve highest/safest level of mobility/gait Progressing        METABOLIC/FLUID AND ELECTROLYTES - ADULT    • Electrolytes maintained within normal limits Progressing    • Hemodynamic stability and optimal renal function
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
## 2017-07-14 ENCOUNTER — APPOINTMENT (OUTPATIENT)
Dept: ULTRASOUND IMAGING | Facility: HOSPITAL | Age: 32
DRG: 176 | End: 2017-07-14
Attending: HOSPITALIST
Payer: COMMERCIAL

## 2017-07-14 LAB
ACETAMINOPHEN: 3.5 UG/ML (ref ?–2)
CERULOPLASMIN: 28.3 MG/DL (ref 20–60)
HAV IGM SER QL: NONREACTIVE
HBV CORE IGM SER QL: NONREACTIVE
HBV SURFACE AG SERPL QL IA: NONREACTIVE
HEPATITIS C VIRUS AB INTERPRETATION: NONREACTIVE
INR BLD: 2.03 (ref 0.89–1.11)
POTASSIUM SERPL-SCNC: 5 MMOL/L (ref 3.6–5.1)
PSA SERPL DL<=0.01 NG/ML-MCNC: 23.3 SECONDS (ref 12–14.3)

## 2017-07-14 PROCEDURE — 76700 US EXAM ABDOM COMPLETE: CPT | Performed by: HOSPITALIST

## 2017-07-14 PROCEDURE — 99233 SBSQ HOSP IP/OBS HIGH 50: CPT | Performed by: HOSPITALIST

## 2017-07-14 RX ORDER — OXYCODONE AND ACETAMINOPHEN 10; 325 MG/1; MG/1
2 TABLET ORAL
Refills: 0 | Status: SHIPPED | COMMUNITY
Start: 2017-07-14 | End: 2017-07-15

## 2017-07-14 RX ORDER — TIZANIDINE 4 MG/1
4 TABLET ORAL EVERY 6 HOURS PRN
Refills: 0 | Status: SHIPPED | COMMUNITY
Start: 2017-07-14 | End: 2017-08-07

## 2017-07-14 RX ORDER — PREGABALIN 25 MG/1
25 CAPSULE ORAL 2 TIMES DAILY
Refills: 0 | Status: SHIPPED | COMMUNITY
Start: 2017-07-14 | End: 2017-08-07 | Stop reason: ALTCHOICE

## 2017-07-14 NOTE — PLAN OF CARE
METABOLIC/FLUID AND ELECTROLYTES - ADULT    • Electrolytes maintained within normal limits Progressing    • Hemodynamic stability and optimal renal function maintained Progressing        MUSCULOSKELETAL - ADULT    • Return mobility to safest level of funct

## 2017-07-14 NOTE — OCCUPATIONAL THERAPY NOTE
OCCUPATIONAL THERAPY TREATMENT NOTE - INPATIENT     Room Number: 0056/8336-Y  Session: 1   Number of Visits to Meet Established Goals: 5    Presenting Problem: R AKA, sternal fracture, L ankle sprain, rib fractures    History related to current admission: drying)?: A Lot  -   Toileting, which includes using toilet, bedpan or urinal? : A Lot  -   Putting on and taking off regular upper body clothing?: A Little  -   Taking care of personal grooming such as brushing teeth?: A Little  -   Eating meals?: None Balance activities; Energy conservation/work simplification techniques;ADL training;Functional transfer training; Endurance training;Patient/Family education;Patient/Family training;Equipment eval/education; Compensatory technique education  Rehab Potential :

## 2017-07-14 NOTE — PHYSICAL THERAPY NOTE
Attempted to see Pt this PM for PT session - Pt currently prepping for d/c to [address] later today. [facility], and long term goals for mobility/prosthetic fitting. Pt appreciative of conversation.   RN aware o

## 2017-07-14 NOTE — CM/SW NOTE
Pt's d/c cancelled by GI. They want pt to have some tests done. Called Fifi Ledesma at [facility] to notify her of pt's d/c cancellation.   SW to f/u with weekend d/c to [facility].

## 2017-07-14 NOTE — PROGRESS NOTES
[facility]  Progress Note     Sadia Ovalles Patient Status:  Inpatient    6/3/1985 MRN [id number]   [facility] 7NE-A Attending Farida Pruitt MD   Hosp Day # 3 PCP Case Curtis MD     Chief Complaint: s/p right AKA    S: Patien Oral BID   • docusate sodium  100 mg Oral BID   • amphetamine-dextroamphetamine  10 mg Oral BID AC   • nicotine  1 patch Transdermal Q24H   • lidocaine  1 patch Transdermal Q24H    Or   • lidocaine  2 patch Transdermal Q24H   • Normal Saline Flush  10 mL I

## 2017-07-14 NOTE — PLAN OF CARE
Assumed care at 0730. Pain management reviewed. Zanaflex q6 and percocet given as ordered. Adequate pain control per patient. RLE dressing changed. GI consulted. U/S tomorrow AM, needs to be NPO after midnight. Needs attended to.       Impaired Function

## 2017-07-14 NOTE — CM/SW NOTE
Fifi Ledesma from [place] [address] called to say that she received insurance auth on pt and that they have a bed for pt today. Pt will go into room 3391. Gave report number to RN [phone].   Pt's parents with drive her to [place] [address] today at 6:00pm.
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
## 2017-07-14 NOTE — CM/SW NOTE
07/14/17 1500   Discharge disposition   Discharged to: [address] [place] [address]   Name of Facillity/Home Care/Hospice 1 [facility]   Patient is Discharged to a [address] [place] [address] Yes   Discharge transportation Private car
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
## 2017-07-15 ENCOUNTER — APPOINTMENT (OUTPATIENT)
Dept: ULTRASOUND IMAGING | Facility: HOSPITAL | Age: 32
DRG: 176 | End: 2017-07-15
Attending: NURSE PRACTITIONER
Payer: COMMERCIAL

## 2017-07-15 VITALS
SYSTOLIC BLOOD PRESSURE: 143 MMHG | HEART RATE: 68 BPM | OXYGEN SATURATION: 100 % | RESPIRATION RATE: 18 BRPM | TEMPERATURE: 98 F | DIASTOLIC BLOOD PRESSURE: 77 MMHG

## 2017-07-15 LAB
ALBUMIN SERPL-MCNC: 2.7 G/DL (ref 3.5–4.8)
ALP LIVER SERPL-CCNC: 109 U/L (ref 37–98)
ALT SERPL-CCNC: 282 U/L (ref 14–54)
AST SERPL-CCNC: 148 U/L (ref 15–41)
BASOPHILS # BLD AUTO: 0.05 X10(3) UL (ref 0–0.1)
BASOPHILS NFR BLD AUTO: 0.7 %
BILIRUB SERPL-MCNC: 0.4 MG/DL (ref 0.1–2)
BUN BLD-MCNC: 5 MG/DL (ref 8–20)
CALCIUM BLD-MCNC: 8.2 MG/DL (ref 8.3–10.3)
CHLORIDE: 106 MMOL/L (ref 101–111)
CK: 224 IU/L (ref 26–192)
CKMB: 0.6 NG/ML (ref 0–5)
CO2: 26 MMOL/L (ref 22–32)
CREAT BLD-MCNC: 0.55 MG/DL (ref 0.55–1.02)
EOSINOPHIL # BLD AUTO: 0.19 X10(3) UL (ref 0–0.3)
EOSINOPHIL NFR BLD AUTO: 2.8 %
ERYTHROCYTE [DISTWIDTH] IN BLOOD BY AUTOMATED COUNT: 14.5 % (ref 11.5–16)
GLUCOSE BLD-MCNC: 93 MG/DL (ref 70–99)
HCT VFR BLD AUTO: 31.1 % (ref 34–50)
HGB BLD-MCNC: 10.2 G/DL (ref 12–16)
IMMATURE GRANULOCYTE COUNT: 0.05 X10(3) UL (ref 0–1)
IMMATURE GRANULOCYTE RATIO %: 0.7 %
INR BLD: 1.58 (ref 0.89–1.11)
LYMPHOCYTES # BLD AUTO: 3.24 X10(3) UL (ref 0.9–4)
LYMPHOCYTES NFR BLD AUTO: 48.3 %
M PROTEIN MFR SERPL ELPH: 6.2 G/DL (ref 6.1–8.3)
MB INDEX: <1 % (ref ?–4)
MCH RBC QN AUTO: 30.4 PG (ref 27–33.2)
MCHC RBC AUTO-ENTMCNC: 32.8 G/DL (ref 31–37)
MCV RBC AUTO: 92.8 FL (ref 81–100)
MONOCYTES # BLD AUTO: 0.38 X10(3) UL (ref 0.1–0.6)
MONOCYTES NFR BLD AUTO: 5.7 %
NEUTROPHIL ABS PRELIM: 2.8 X10 (3) UL (ref 1.3–6.7)
NEUTROPHILS # BLD AUTO: 2.8 X10(3) UL (ref 1.3–6.7)
NEUTROPHILS NFR BLD AUTO: 41.8 %
PLATELET # BLD AUTO: 239 10(3)UL (ref 150–450)
POTASSIUM SERPL-SCNC: 3.9 MMOL/L (ref 3.6–5.1)
PSA SERPL DL<=0.01 NG/ML-MCNC: 19 SECONDS (ref 12–14.3)
RBC # BLD AUTO: 3.35 X10(6)UL (ref 3.8–5.1)
RED CELL DISTRIBUTION WIDTH-SD: 47 FL (ref 35.1–46.3)
SODIUM SERPL-SCNC: 139 MMOL/L (ref 136–144)
WBC # BLD AUTO: 6.7 X10(3) UL (ref 4–13)

## 2017-07-15 PROCEDURE — 99239 HOSP IP/OBS DSCHRG MGMT >30: CPT | Performed by: HOSPITALIST

## 2017-07-15 PROCEDURE — 93975 VASCULAR STUDY: CPT | Performed by: NURSE PRACTITIONER

## 2017-07-15 RX ORDER — OXYCODONE HYDROCHLORIDE 20 MG/1
20 TABLET ORAL EVERY 4 HOURS
Qty: 20 TABLET | Refills: 0 | Status: SHIPPED
Start: 2017-07-15 | End: 2017-08-07 | Stop reason: ALTCHOICE

## 2017-07-15 RX ORDER — OXYCODONE HYDROCHLORIDE 10 MG/1
20 TABLET ORAL EVERY 4 HOURS
Status: DISCONTINUED | OUTPATIENT
Start: 2017-07-15 | End: 2017-07-15

## 2017-07-15 RX ORDER — ALBUTEROL SULFATE 90 UG/1
2 AEROSOL, METERED RESPIRATORY (INHALATION) EVERY 4 HOURS PRN
Qty: 1 INHALER | Refills: 1 | Status: SHIPPED | OUTPATIENT
Start: 2017-07-15 | End: 2019-09-10

## 2017-07-15 NOTE — PROGRESS NOTES
Pain Service    Page from [facility]. GI would like pain medication recommendations without tylenol. Pain well managed on scheduled Percocet. Will d/c scheduled Percocet  Will order Oxycodone 20 mg po q 4hr scheduled    Pt to be d/c to rehab.   No further recomm

## 2017-07-15 NOTE — PROGRESS NOTES
Gastroenterology Progress Note  S: No new complaints, feels well, wants to go home  O: /77 (BP Location: Right arm)   Pulse 68   Temp 97.6 °F (36.4 °C) (Oral)   Resp 18   SpO2 100%   Gen: AAOx2  CV: RRR with normal S1 / S2  Resp: CTA bilaterally  Abd follow up of additional serologies. 3. No further GI recommendations at this time. Will sign off. Please feel free to contact us with further questions or concerns or if there is any change in patient's status.     Taisha Staton MD  1:12 PM  7/15/20

## 2017-07-15 NOTE — PROGRESS NOTES
[facility] HOSPITALIST  Progress Note     Sadia Ovalles Patient Status:  Inpatient    6/3/1985 MRN [id number]   [facility] 7NE-A Attending Farida Pruitt MD   Hosp Day # 4 PCP Chai Valles MD     Chief Complaint: s/p right AKA    S: Patien tablet Oral 6 times per day   • pregabalin  25 mg Oral BID   • famoTIDine  20 mg Oral BID   • docusate sodium  100 mg Oral BID   • amphetamine-dextroamphetamine  10 mg Oral BID AC   • nicotine  1 patch Transdermal Q24H   • lidocaine  1 patch Transdermal Q2

## 2017-07-15 NOTE — PLAN OF CARE
Report was called to [employer] rn. Pt discharged to [facility]. No c/o and in no apparent distress. To [facility] per parents driving her. Pt very motivated to go.

## 2017-07-15 NOTE — CM/SW NOTE
Pt cleared for discharge today. Pt will go into room 3310. Gave report number to RN [phone].   Pt's parents with drive her to [facility]  today at 330pm.

## 2017-07-17 LAB
ALPHA-1-ANTITRYPSIN: 251 MG/DL
ANA SCREEN: NEGATIVE
F-ACTIN (SMOOTH MUSCLE) AB: 12 UNITS
MITOCHONDRIAL M2 AB, IGG: 3.1 UNITS
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
## 2017-07-17 NOTE — CM/SW NOTE
07/17/17 0900   Discharge disposition   Discharged to: [address] [place] [address]   Name of Facillity/Home Care/Hospice [facility]   Patient is Discharged to a [address] [place] [address] Yes   Discharge transportation Private car

## 2017-08-02 PROBLEM — IMO0001: Status: ACTIVE | Noted: 2017-08-02

## 2017-08-07 PROBLEM — S88.921A: Status: ACTIVE | Noted: 2017-07-07

## 2017-08-07 PROBLEM — S22.22XA CLOSED FRACTURE OF BODY OF STERNUM: Status: ACTIVE | Noted: 2017-07-10

## 2017-08-07 NOTE — ASSESSMENT & PLAN NOTE
Trial of fluoxetine switching to duloxetine because of phantom limb pain and hopefully duloxetine will do better. Risks and benefits explained.   She is overall in good mood and good spirits despite how severe her injuries are and her above-the-knee amputa
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
## 2017-08-07 NOTE — ASSESSMENT & PLAN NOTE
Stump is healing well, 1 staples removed today. She is in relatively good spirits and will hopefully start prosthetic in the next 1-2 months.
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
## 2017-08-09 NOTE — PAYOR COMM NOTE
--------------  CONTINUED STAY REVIEW    Payor: Tracie Browning [address] #:  [id number]  Authorization Number: [id number]    Admit date: 7/11/17  Admit time: 46    Admitting Physician: Marciano Bernabe MD  Attending Physician:  Kalee

## 2017-08-20 ENCOUNTER — PATIENT MESSAGE (OUTPATIENT)
Dept: FAMILY MEDICINE CLINIC | Facility: CLINIC | Age: 32
End: 2017-08-20

## 2017-08-21 NOTE — TELEPHONE ENCOUNTER
From: Jing Cooper  To: Faustino Dixon MD  Sent: 8/20/2017 1:55 PM CDT  Subject: Prescription Question    Hi,  Am I supposed to continue using the gabapentin along with the cymbalta?  I have been taking both but I only have about a week left of the angel

## 2017-08-22 ENCOUNTER — TELEPHONE (OUTPATIENT)
Dept: FAMILY MEDICINE CLINIC | Facility: CLINIC | Age: 32
End: 2017-08-22

## 2017-08-22 RX ORDER — GABAPENTIN 400 MG/1
800 CAPSULE ORAL 3 TIMES DAILY
Qty: 180 CAPSULE | Refills: 2 | Status: SHIPPED | OUTPATIENT
Start: 2017-08-22 | End: 2017-08-31

## 2017-08-30 DIAGNOSIS — F33.1 MODERATE EPISODE OF RECURRENT MAJOR DEPRESSIVE DISORDER (HCC): ICD-10-CM

## 2017-08-31 ENCOUNTER — OFFICE VISIT (OUTPATIENT)
Dept: FAMILY MEDICINE CLINIC | Facility: CLINIC | Age: 32
End: 2017-08-31

## 2017-08-31 VITALS
TEMPERATURE: 99 F | HEART RATE: 80 BPM | WEIGHT: 184 LBS | BODY MASS INDEX: 31 KG/M2 | DIASTOLIC BLOOD PRESSURE: 68 MMHG | SYSTOLIC BLOOD PRESSURE: 122 MMHG | RESPIRATION RATE: 16 BRPM

## 2017-08-31 DIAGNOSIS — I26.99 OTHER ACUTE PULMONARY EMBOLISM WITHOUT ACUTE COR PULMONALE (HCC): ICD-10-CM

## 2017-08-31 DIAGNOSIS — S78.111A ABOVE KNEE AMPUTATION OF RIGHT LOWER EXTREMITY (HCC): Primary | ICD-10-CM

## 2017-08-31 DIAGNOSIS — F33.1 MAJOR DEPRESSIVE DISORDER, RECURRENT EPISODE, MODERATE (HCC): ICD-10-CM

## 2017-08-31 DIAGNOSIS — S88.921D: ICD-10-CM

## 2017-08-31 PROCEDURE — 99213 OFFICE O/P EST LOW 20 MIN: CPT | Performed by: FAMILY MEDICINE

## 2017-08-31 RX ORDER — OXYCODONE AND ACETAMINOPHEN 10; 325 MG/1; MG/1
2 TABLET ORAL EVERY 4 HOURS PRN
Qty: 120 TABLET | Refills: 0 | Status: SHIPPED | OUTPATIENT
Start: 2017-09-17 | End: 2017-10-01

## 2017-08-31 RX ORDER — TIZANIDINE 4 MG/1
4 TABLET ORAL EVERY 6 HOURS PRN
Qty: 90 TABLET | Refills: 3 | Status: SHIPPED | OUTPATIENT
Start: 2017-08-31 | End: 2018-03-26 | Stop reason: ALTCHOICE

## 2017-08-31 RX ORDER — GABAPENTIN 400 MG/1
800 CAPSULE ORAL 3 TIMES DAILY
Qty: 180 CAPSULE | Refills: 5 | Status: SHIPPED | OUTPATIENT
Start: 2017-08-31 | End: 2017-11-17 | Stop reason: ALTCHOICE

## 2017-08-31 RX ORDER — OXYCODONE AND ACETAMINOPHEN 10; 325 MG/1; MG/1
2 TABLET ORAL EVERY 4 HOURS PRN
Qty: 120 TABLET | Refills: 0 | Status: SHIPPED | OUTPATIENT
Start: 2017-09-03 | End: 2017-10-09

## 2017-08-31 RX ORDER — DULOXETIN HYDROCHLORIDE 60 MG/1
60 CAPSULE, DELAYED RELEASE ORAL DAILY
Qty: 90 CAPSULE | Refills: 1 | Status: SHIPPED | OUTPATIENT
Start: 2017-08-31 | End: 2017-10-09

## 2017-08-31 RX ORDER — FLUOXETINE 10 MG/1
CAPSULE ORAL
Qty: 30 CAPSULE | Refills: 0 | OUTPATIENT
Start: 2017-08-31

## 2017-08-31 NOTE — TELEPHONE ENCOUNTER
Medication not on protocol LOV 8/7/2017 with Dr Vignesh Palafox this medication has been replaced called pharmacy to inform them this is done
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
## 2017-09-01 ENCOUNTER — TELEPHONE (OUTPATIENT)
Dept: FAMILY MEDICINE CLINIC | Facility: CLINIC | Age: 32
End: 2017-09-01

## 2017-09-01 NOTE — TELEPHONE ENCOUNTER
I refilled the previous prescription and he must of been entered wrong at the hospital so I corrected it and sent the 20 mg tablets

## 2017-09-01 NOTE — TELEPHONE ENCOUNTER
Insurance is stating they want the 20 mg of the Zarelto instead of 10 mg. It is [facility] that is calling with this information. Call with questions.

## 2017-09-05 ENCOUNTER — TELEPHONE (OUTPATIENT)
Dept: FAMILY MEDICINE CLINIC | Facility: CLINIC | Age: 32
End: 2017-09-05

## 2017-09-12 PROBLEM — S93.412D SPRAIN OF CALCANEOFIBULAR LIGAMENT OF LEFT ANKLE, SUBSEQUENT ENCOUNTER: Status: ACTIVE | Noted: 2017-08-02
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
## 2017-10-09 NOTE — ASSESSMENT & PLAN NOTE
Pain was better with this, but now more pain with prosthesis. Switch back to fluoxetine but at 40 mg dose.  Reassess in 6 weeks

## 2017-10-10 NOTE — ASSESSMENT & PLAN NOTE
She will finish Xarelto in 2 weeks and does not need to go back on it as she is now quite physically active and we will resolve this problem

## 2017-10-10 NOTE — ASSESSMENT & PLAN NOTE
Pain is increased likely due to needing to train for the prostatic use.   She is making progress but needs more pain medication, reassess in 6 weeks

## 2017-11-17 NOTE — PROGRESS NOTES
Patient presents with:  Depression: 6 week f/u   Paperwork: Pt needs letter to go back to work and school       HPI:   This is a 28year old female coming in for follow up reent amputation, on third leg socket, PT slowed b.o this, Flu vac today.      HPI mo Resp 16   Wt 190 lb   BMI 31.62 kg/m²  Body mass index is 31.62 kg/m². Physical Exam   Nursing note and vitals reviewed. Constitutional: She is oriented to person, place, and time. She appears well-developed and well-nourished. No distress.    HENT: 7/11/2017 on Xarelto 20 through October 2017            [address] [place] [address]    Relevant Medications    ALPRAZolam 0.5 MG Oral Tab    Major depressive disorder, recurrent episode, moderate (HCC) - Primary    Overview      fluoxetine 40 (sleepy in Cymba

## 2017-12-10 ENCOUNTER — HOSPITAL ENCOUNTER (OUTPATIENT)
Age: 32
Discharge: HOME | End: 2017-12-10
Attending: FAMILY MEDICINE
Payer: COMMERCIAL

## 2017-12-10 VITALS
RESPIRATION RATE: 16 BRPM | HEART RATE: 91 BPM | OXYGEN SATURATION: 100 % | HEIGHT: 63 IN | WEIGHT: 170 LBS | DIASTOLIC BLOOD PRESSURE: 99 MMHG | SYSTOLIC BLOOD PRESSURE: 138 MMHG | TEMPERATURE: 97 F | BODY MASS INDEX: 30.12 KG/M2

## 2017-12-10 DIAGNOSIS — Z89.611 S/P ABOVE KNEE AMPUTATION, RIGHT (HCC): ICD-10-CM

## 2017-12-10 DIAGNOSIS — S30.0XXA CONTUSION OF BUTTOCK, INITIAL ENCOUNTER: ICD-10-CM

## 2017-12-10 DIAGNOSIS — S30.23XA CONTUSION OF LABIA MAJORA, INITIAL ENCOUNTER: Primary | ICD-10-CM

## 2017-12-10 DIAGNOSIS — S30.1XXA CONTUSION OF RIGHT GROIN, INITIAL ENCOUNTER: ICD-10-CM

## 2017-12-10 PROCEDURE — 81002 URINALYSIS NONAUTO W/O SCOPE: CPT | Performed by: FAMILY MEDICINE

## 2017-12-10 PROCEDURE — 99213 OFFICE O/P EST LOW 20 MIN: CPT

## 2017-12-10 PROCEDURE — 99212 OFFICE O/P EST SF 10 MIN: CPT

## 2017-12-10 RX ORDER — OXYCODONE AND ACETAMINOPHEN 10; 325 MG/1; MG/1
1 TABLET ORAL EVERY 4 HOURS PRN
COMMUNITY
End: 2018-03-26 | Stop reason: ALTCHOICE

## 2017-12-10 NOTE — ED PROVIDER NOTES
Patient Seen in: [facility]    History   Patient presents with:  Lower Extremity Injury (musculoskeletal)    Stated Complaint: CHECK RIGHT LEG    60-year-old female comes in to get checked for concerns of fall, injury to th Comment: 1/4 PPD  Alcohol use: Yes           3.0 oz/week     Standard drinks or equivalent: 5 per week     Comment: on occasion      Review of Systems   Constitutional: Negative for activity change and fever. HENT: Negative for congestion.     Respirato ecchymosis, swelling over the right groin and right side into the gluteal area with mild tenderness. Good range of hip movements, no deformity noted. Neurological: She is alert and oriented to person, place, and time. Skin: Skin is warm and dry.    Psy

## 2017-12-10 NOTE — ED NOTES
Present with Dr. Courtney Deal for physical exam of genital area. Mom also present in the room at the bedside.
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
## 2017-12-19 ENCOUNTER — APPOINTMENT (OUTPATIENT)
Dept: OTHER | Facility: HOSPITAL | Age: 32
End: 2017-12-19
Attending: PREVENTIVE MEDICINE

## 2017-12-20 ENCOUNTER — TELEPHONE (OUTPATIENT)
Dept: FAMILY MEDICINE CLINIC | Facility: CLINIC | Age: 32
End: 2017-12-20

## 2017-12-20 NOTE — TELEPHONE ENCOUNTER
Shivani Mccurdy called stating patient needs an appointment for first time for wheel chair evaluation. Patient needs referral in order to be seen.  Patient's appointment today was cancel because patient did not have a referral.   Fax [phone]
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
## 2018-01-08 NOTE — TELEPHONE ENCOUNTER
Called and talked to patient she does not need this referral but will call us if she needs something

## 2018-01-11 ENCOUNTER — HOSPITAL ENCOUNTER (OUTPATIENT)
Dept: PHYSICAL THERAPY | Facility: HOSPITAL | Age: 33
Setting detail: THERAPIES SERIES
Discharge: HOME | End: 2018-01-11
Attending: PREVENTIVE MEDICINE
Payer: COMMERCIAL

## 2018-01-11 NOTE — PROGRESS NOTES
FALL SCREEN EVALUATION   Referring Physician: Dr. Kenneth Arshad  Diagnosis: s/p AKA R   Date of Service: 1/11/2018     PATIENT SUMMARY   Jen Broussard is a 28year old y/o female who presents to therapy today for evaluation of her functional status y/o Men: 15, Women: 12       4 Item Dynamic Gait Index Score:11/12 Fall Risk: no  [Scores of 10 or less indicate increased risk for falls]  1. Gait level surface: 2  Grading: Dixon the lowest category that applies.   (3)  Normal: Walks 20’, no assistive serena loses balance, stops, reaches for wall. Gait with vertical head turns:3  Grading: [facility] the lowest category that applies. (3) Normal: Preforms head turns smoothly with no change in gait.   (2) Mild Impairment: Preforms head turns smoothly with slight abdirizak
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
## 2018-03-27 NOTE — ASSESSMENT & PLAN NOTE
Patient is doing remarkably well and has had substantial improvement overall. She is currently wearing a prosthetic socket that she received in December 2017. This is the first prosthesis that she has received.   Since delivery, she has decreased in limb

## 2018-05-29 ENCOUNTER — TELEPHONE (OUTPATIENT)
Dept: FAMILY MEDICINE CLINIC | Facility: CLINIC | Age: 33
End: 2018-05-29

## 2018-05-29 NOTE — TELEPHONE ENCOUNTER
pts mother took her car and cleaned it out and threw out her refills for Adderral.  Please re write them to . Please call pt when ready.

## 2018-05-30 RX ORDER — DEXTROAMPHETAMINE SACCHARATE, AMPHETAMINE ASPARTATE MONOHYDRATE, DEXTROAMPHETAMINE SULFATE AND AMPHETAMINE SULFATE 5; 5; 5; 5 MG/1; MG/1; MG/1; MG/1
20 CAPSULE, EXTENDED RELEASE ORAL DAILY
Qty: 30 CAPSULE | Refills: 0 | Status: SHIPPED | OUTPATIENT
Start: 2018-05-30 | End: 2018-06-14 | Stop reason: ALTCHOICE

## 2018-06-14 NOTE — PROGRESS NOTES
Sadia Ovalles is a 35year old female.  Patient presents with:  ADHD: 3 month med refill  Abscess: Pt states she has an abscess on her bikini line x4 days it popped and has been draining since        Subjective    HPI:   Patient presents for recheck of Acetaminophen-Codeine #3 300-30 MG Oral Tab Take 1 tablet by mouth every 4 (four) hours as needed for Pain. Disp: 30 tablet Rfl: 2   FLUoxetine HCl 40 MG Oral Cap Take 1 capsule (40 mg total) by mouth daily.  Disp: 90 capsule Rfl: 1   Albuterol Sulfate HFA Cardiovascular: Normal rate, regular rhythm and normal heart sounds. No murmur heard. Pulmonary/Chest: Effort normal and breath sounds normal. No respiratory distress. Abdominal: Soft.  Normal appearance and bowel sounds are normal. There is no tender cephALEXin 500 MG Oral Cap         · Symptoms are well controlled, no significant medication side effects noted. ·  will continue present medications. The patient indicates understanding of these issues and agrees to the plan.   · Medications refilled x 3

## 2018-06-29 DIAGNOSIS — F33.1 MAJOR DEPRESSIVE DISORDER, RECURRENT EPISODE, MODERATE (HCC): ICD-10-CM

## 2018-06-30 NOTE — TELEPHONE ENCOUNTER
From: Samson Landeros  Sent: 6/29/2018 11:39 PM CDT  Subject: Medication Renewal Request    Samson Landeros would like a refill of the following medications:     FLUoxetine HCl 40 MG Oral Cap Edmund Lujan MD]   Patient Comment: I was in a few weeks a

## 2018-06-30 NOTE — TELEPHONE ENCOUNTER
LOV 6/14/2018   F/u advised in 3 months. No future appointments. Refill request for:      Pending Prescriptions Disp Refills    FLUoxetine HCl 40 MG Oral Cap 90 capsule 1     Sig: Take 1 capsule (40 mg total) by mouth daily.             Not on protocol

## 2018-07-02 RX ORDER — FLUOXETINE HYDROCHLORIDE 40 MG/1
40 CAPSULE ORAL DAILY
Qty: 90 CAPSULE | Refills: 0 | Status: SHIPPED | OUTPATIENT
Start: 2018-07-02 | End: 2018-09-21

## 2018-08-05 DIAGNOSIS — F41.9 ANXIETY: ICD-10-CM

## 2018-08-06 RX ORDER — ALPRAZOLAM 0.5 MG/1
TABLET ORAL
Qty: 60 TABLET | Refills: 1 | OUTPATIENT
Start: 2018-08-06 | End: 2018-09-21

## 2018-08-06 NOTE — TELEPHONE ENCOUNTER
LOV 6/14/2018 with Garth Duckworth with a note to return around 3 months. No future appointments.      Refill request for:      Pending Prescriptions Disp Refills    ALPRAZOLAM 0.5 MG Oral Tab [Pharmacy Med Name: ALPRAZOLAM 0.5MG TABLETS] 60 tablet 0     Sig: TA

## 2018-09-10 ENCOUNTER — CHARTING TRANS (OUTPATIENT)
Dept: OTHER | Age: 33
End: 2018-09-10

## 2018-09-21 ENCOUNTER — OFFICE VISIT (OUTPATIENT)
Dept: FAMILY MEDICINE CLINIC | Facility: CLINIC | Age: 33
End: 2018-09-21
Payer: COMMERCIAL

## 2018-09-21 VITALS
TEMPERATURE: 99 F | WEIGHT: 183 LBS | DIASTOLIC BLOOD PRESSURE: 78 MMHG | HEIGHT: 62 IN | SYSTOLIC BLOOD PRESSURE: 110 MMHG | HEART RATE: 70 BPM | BODY MASS INDEX: 33.68 KG/M2

## 2018-09-21 DIAGNOSIS — F33.1 MAJOR DEPRESSIVE DISORDER, RECURRENT EPISODE, MODERATE (HCC): ICD-10-CM

## 2018-09-21 DIAGNOSIS — Z97.5 BREAKTHROUGH BLEEDING ON NEXPLANON: Primary | ICD-10-CM

## 2018-09-21 DIAGNOSIS — F41.9 ANXIETY: ICD-10-CM

## 2018-09-21 DIAGNOSIS — N89.8 VAGINAL DISCHARGE: ICD-10-CM

## 2018-09-21 DIAGNOSIS — F90.0 ATTENTION DEFICIT HYPERACTIVITY DISORDER (ADHD), PREDOMINANTLY INATTENTIVE TYPE: ICD-10-CM

## 2018-09-21 DIAGNOSIS — N92.1 BREAKTHROUGH BLEEDING ON NEXPLANON: Primary | ICD-10-CM

## 2018-09-21 DIAGNOSIS — L40.9 PSORIASIS: ICD-10-CM

## 2018-09-21 PROCEDURE — 87480 CANDIDA DNA DIR PROBE: CPT | Performed by: FAMILY MEDICINE

## 2018-09-21 PROCEDURE — 87660 TRICHOMONAS VAGIN DIR PROBE: CPT | Performed by: FAMILY MEDICINE

## 2018-09-21 PROCEDURE — 87510 GARDNER VAG DNA DIR PROBE: CPT | Performed by: FAMILY MEDICINE

## 2018-09-21 PROCEDURE — 99214 OFFICE O/P EST MOD 30 MIN: CPT | Performed by: FAMILY MEDICINE

## 2018-09-21 RX ORDER — NORGESTIMATE AND ETHINYL ESTRADIOL 0.25-0.035
1 KIT ORAL DAILY
Qty: 1 PACKAGE | Refills: 2 | Status: SHIPPED | OUTPATIENT
Start: 2018-09-21 | End: 2019-04-01 | Stop reason: ALTCHOICE

## 2018-09-21 RX ORDER — FLUCONAZOLE 150 MG/1
150 TABLET ORAL ONCE
Qty: 1 TABLET | Refills: 0 | Status: SHIPPED | OUTPATIENT
Start: 2018-09-21 | End: 2018-09-21

## 2018-09-21 RX ORDER — MOMETASONE FUROATE 1 MG/G
1 CREAM TOPICAL 2 TIMES DAILY PRN
Qty: 45 G | Refills: 3 | Status: SHIPPED | OUTPATIENT
Start: 2018-09-21 | End: 2019-09-10

## 2018-09-21 RX ORDER — ALPRAZOLAM 0.5 MG/1
TABLET ORAL
Qty: 60 TABLET | Refills: 1 | Status: SHIPPED | OUTPATIENT
Start: 2018-09-21 | End: 2019-04-01

## 2018-09-21 RX ORDER — FLUOXETINE HYDROCHLORIDE 40 MG/1
40 CAPSULE ORAL DAILY
Qty: 90 CAPSULE | Refills: 1 | Status: SHIPPED | OUTPATIENT
Start: 2018-09-21 | End: 2019-04-01

## 2018-09-21 RX ORDER — DEXTROAMPHETAMINE SACCHARATE, AMPHETAMINE ASPARTATE MONOHYDRATE, DEXTROAMPHETAMINE SULFATE AND AMPHETAMINE SULFATE 5; 5; 5; 5 MG/1; MG/1; MG/1; MG/1
20 CAPSULE, EXTENDED RELEASE ORAL 2 TIMES DAILY
Qty: 60 CAPSULE | Refills: 0 | Status: SHIPPED | OUTPATIENT
Start: 2018-09-21 | End: 2019-01-09

## 2018-09-21 RX ORDER — DEXTROAMPHETAMINE SACCHARATE, AMPHETAMINE ASPARTATE MONOHYDRATE, DEXTROAMPHETAMINE SULFATE AND AMPHETAMINE SULFATE 5; 5; 5; 5 MG/1; MG/1; MG/1; MG/1
20 CAPSULE, EXTENDED RELEASE ORAL 2 TIMES DAILY
Qty: 30 CAPSULE | Refills: 0 | Status: SHIPPED | OUTPATIENT
Start: 2018-11-20 | End: 2019-01-09

## 2018-09-21 RX ORDER — DEXTROAMPHETAMINE SACCHARATE, AMPHETAMINE ASPARTATE MONOHYDRATE, DEXTROAMPHETAMINE SULFATE AND AMPHETAMINE SULFATE 5; 5; 5; 5 MG/1; MG/1; MG/1; MG/1
20 CAPSULE, EXTENDED RELEASE ORAL 2 TIMES DAILY
Qty: 60 CAPSULE | Refills: 0 | Status: SHIPPED | OUTPATIENT
Start: 2018-10-21 | End: 2019-01-09
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
## 2018-09-24 ENCOUNTER — TELEPHONE (OUTPATIENT)
Dept: FAMILY MEDICINE CLINIC | Facility: CLINIC | Age: 33
End: 2018-09-24

## 2018-09-24 RX ORDER — METRONIDAZOLE 500 MG/1
500 TABLET ORAL 2 TIMES DAILY
Qty: 14 TABLET | Refills: 0 | Status: SHIPPED | OUTPATIENT
Start: 2018-09-24 | End: 2019-01-09 | Stop reason: ALTCHOICE

## 2018-09-24 NOTE — TELEPHONE ENCOUNTER
TC from patient she works for [employer] [facility] [employer] viewed results of vaginal pathology is requesting if medication needed please send to [facility] in [place] on [address].    Patient can be contacted at [phone]

## 2018-10-21 ENCOUNTER — CHARTING TRANS (OUTPATIENT)
Dept: OTHER | Age: 33
End: 2018-10-21

## 2019-01-09 NOTE — PROGRESS NOTES
Rachael Ness is a 35year old female. Patient presents with:  ADHD: Adderall refill  Complete Form: for ne prothetic  Note For Work: Regarding TB testing not needed-        Subjective    HPI:   Patient presents for recheck of her ADHD.   Patient has be Amphetamine-Dextroamphet ER (ADDERALL XR) 20 MG Oral Capsule SR 24 Hr Take 1 capsule (20 mg total) by mouth 2 (two) times daily.  Disp: 60 capsule Rfl: 0   Amphetamine-Dextroamphet ER (ADDERALL XR) 20 MG Oral Capsule SR 24 Hr Take 1 capsule (20 mg total) by Skin: Skin is warm and dry. No rash noted. Psychiatric: She has a normal mood and affect.  Judgment and thought content normal.        Assessment      Problem List Items Addressed This Visit        Mental Health    Attention deficit hyperactivity disorder

## 2019-01-14 ENCOUNTER — TELEPHONE (OUTPATIENT)
Dept: FAMILY MEDICINE CLINIC | Facility: CLINIC | Age: 34
End: 2019-01-14

## 2019-01-17 NOTE — TELEPHONE ENCOUNTER
Received fax from [address] [place] [address] requesting office notes faxed to them from [address] [place] [address] Drive 1/9/19 be amended.  Form in Dr Hendrickson Milder

## 2019-03-28 ENCOUNTER — OFFICE VISIT (OUTPATIENT)
Dept: FAMILY MEDICINE CLINIC | Facility: CLINIC | Age: 34
End: 2019-03-28
Payer: COMMERCIAL

## 2019-03-28 VITALS
DIASTOLIC BLOOD PRESSURE: 82 MMHG | WEIGHT: 197 LBS | OXYGEN SATURATION: 98 % | TEMPERATURE: 99 F | HEIGHT: 62 IN | BODY MASS INDEX: 36.25 KG/M2 | SYSTOLIC BLOOD PRESSURE: 136 MMHG | HEART RATE: 90 BPM | RESPIRATION RATE: 16 BRPM

## 2019-03-28 DIAGNOSIS — K08.89 TOOTH PAIN: Primary | ICD-10-CM

## 2019-03-28 PROCEDURE — 99213 OFFICE O/P EST LOW 20 MIN: CPT | Performed by: PHYSICIAN ASSISTANT

## 2019-03-28 RX ORDER — IBUPROFEN 800 MG/1
800 TABLET ORAL EVERY 8 HOURS PRN
Qty: 30 TABLET | Refills: 0 | Status: SHIPPED | OUTPATIENT
Start: 2019-03-28 | End: 2019-09-10 | Stop reason: ALTCHOICE

## 2019-03-28 RX ORDER — AMOXICILLIN AND CLAVULANATE POTASSIUM 875; 125 MG/1; MG/1
1 TABLET, FILM COATED ORAL 2 TIMES DAILY
Qty: 20 TABLET | Refills: 0 | Status: SHIPPED | OUTPATIENT
Start: 2019-03-28 | End: 2019-04-07

## 2019-03-29 NOTE — PROGRESS NOTES
CC: Dental issue       HISTORY OF PRESENT ILLNESS  Stefani Be is a 35year old female who presents for evaluation of dental pain.   She has been having pain for quite a while and was evaluated by her dentist.  She was referred to an endodontist for a •  ALPRAZolam 0.5 MG Oral Tab, TAKE 1 TABLET BY MOUTH TWICE NIGHTLY, Disp: 60 tablet, Rfl: 1  •  FLUoxetine HCl 40 MG Oral Cap, Take 1 capsule (40 mg total) by mouth daily. , Disp: 90 capsule, Rfl: 1  •  Mometasone Furoate 0.1 % External Cream, Apply 1 Appl Years: 14.00        Pack years: 3.5      Smokeless tobacco: Never Used      Tobacco comment: 1/4 PPD    Alcohol use:  Yes      Alcohol/week: 3.0 oz      Types: 5 Standard drinks or equivalent per week      Comment: on occasion    Drug use: No       03

## 2019-04-01 NOTE — PROGRESS NOTES
Patient presents with:   Anxiety: refill on Xanax  ADD: refill on Adderall      Subjective   HPI:   This is a 35year old female coming in for ASDD, doing well, AKA on right, trying to walk but trouoble wwith socket, cannot stand for more thanm 5 to 6 hours Problem List Items Addressed This Visit        Mental Health    Attention deficit hyperactivity disorder (ADHD), predominantly inattentive type - Primary    Overview     Adderall 20 switched from ER 4/1/2019          Current Assessment & Plan     Adjus new dynamic flexible prosthetic foot. Return in about 3 months (around 7/1/2019).     Sima Gillette MD, 4/1/2019, 3:47 PM

## 2019-04-04 ENCOUNTER — TELEPHONE (OUTPATIENT)
Dept: FAMILY MEDICINE CLINIC | Facility: CLINIC | Age: 34
End: 2019-04-04

## 2019-04-04 NOTE — TELEPHONE ENCOUNTER
Referral request [facility]    Patient in need of new prosthetic foot. Dr. Hubert Rosas patient had a recent visit. We received a request from Paulette Coats who is assisting patient is receiving their new prosthetic foot.   Per [employer] the follow

## 2019-04-19 ENCOUNTER — TELEPHONE (OUTPATIENT)
Dept: FAMILY MEDICINE CLINIC | Facility: CLINIC | Age: 34
End: 2019-04-19

## 2019-04-19 NOTE — TELEPHONE ENCOUNTER
TC to patient regarding denial we received from [facility] for prosthetics. Left message for patient to return my call.

## 2019-04-24 ENCOUNTER — TELEPHONE (OUTPATIENT)
Dept: FAMILY MEDICINE CLINIC | Facility: CLINIC | Age: 34
End: 2019-04-24

## 2019-04-24 NOTE — TELEPHONE ENCOUNTER
Franchesca Das from [facility] called to inform us that pt's Prosthetic foot was denied and want to know if you would do a Peer to Peer?

## 2019-09-11 NOTE — PROGRESS NOTES
Patient presents with:  Asthma: ACT due, medication refill for Albuterol  ADHD: Medication refill for Adderall  Derm Problem: Medication refill for Mometasone  Imm/Inj: Flu shot    HISTORY OF PRESENT ILLNESS  Amilcar Marcus is a 29year old female who p daily., Disp: 30 tablet, Rfl: 0  •  [START ON 11/11/2019] amphetamine-dextroamphetamine (ADDERALL) 20 MG Oral Tab, Take 1 tablet (20 mg total) by mouth daily. , Disp: 30 tablet, Rfl: 0  •  ALPRAZolam 0.5 MG Oral Tab, TAKE 1 TABLET BY MOUTH TWICE NIGHTLY, Sanjana Tripp rhythm. PULMONOLOGY: Normal effort on room air. Clear to auscultation bilaterally. PSYCH: Pleasant and cooperative. Normal thought process and content. Normal mood and affect. SKIN: Psoriasis patches on elbows.   EXTREMITIES: 2+ radial pulses bilaterally

## 2019-11-26 ENCOUNTER — TELEPHONE (OUTPATIENT)
Dept: FAMILY MEDICINE CLINIC | Facility: CLINIC | Age: 34
End: 2019-11-26

## 2019-12-12 ENCOUNTER — TELEPHONE (OUTPATIENT)
Dept: FAMILY MEDICINE CLINIC | Facility: CLINIC | Age: 34
End: 2019-12-12

## 2019-12-12 DIAGNOSIS — Z89.611 ACQUIRED ABSENCE OF RIGHT LEG ABOVE KNEE (HCC): Primary | ICD-10-CM

## 2019-12-12 NOTE — TELEPHONE ENCOUNTER
Referral request [facility] [address] quantity 2    DX Z89.611 Acquired absence of right leg above knee

## 2020-03-12 ENCOUNTER — TELEPHONE (OUTPATIENT)
Dept: SCHEDULING | Age: 35
End: 2020-03-12

## 2020-03-16 ENCOUNTER — TELEPHONE (OUTPATIENT)
Dept: FAMILY MEDICINE CLINIC | Facility: CLINIC | Age: 35
End: 2020-03-16

## 2020-03-16 NOTE — TELEPHONE ENCOUNTER
Pt requesting to have Nexplanon inserted.  Was informed that it needed to be authorized first and we will contact her back once that happens to schedule her appt

## 2020-03-16 NOTE — TELEPHONE ENCOUNTER
Truthfully, I do not recall if we discussed- if we did, it was likely in passing or in response to a pamphlet in the office, which is why it is not included in the note. I will call her tomorrow to discuss then we can start PA process.  Will obviously n

## 2020-03-16 NOTE — TELEPHONE ENCOUNTER
Bakari Valenzuela with you on 9/10/19, I reviewed the OV note and didn't see a reference to Nexplanon, was councilling  done for this?

## 2020-03-18 NOTE — TELEPHONE ENCOUNTER
Tried to do e-visit to discuss Nexplanon as I have not seen her since Sept. Left VM to call back when she can before we start PA for nexplanon.

## 2020-03-23 ENCOUNTER — TELEPHONE (OUTPATIENT)
Dept: FAMILY MEDICINE CLINIC | Facility: CLINIC | Age: 35
End: 2020-03-23

## 2020-03-23 DIAGNOSIS — Z89.611 ACQUIRED ABSENCE OF RIGHT LEG ABOVE KNEE (HCC): Primary | ICD-10-CM

## 2020-03-23 NOTE — TELEPHONE ENCOUNTER
Referral request [facility] [employer]  Fax number: [phone]    DX: [phone]     quantity 1   quantity 2   quantity 1   quantity 1   quantity 1   quantity 1   quantity 1   quantity 2   quantity 1   qu

## 2020-03-24 ENCOUNTER — PATIENT MESSAGE (OUTPATIENT)
Dept: FAMILY MEDICINE CLINIC | Facility: CLINIC | Age: 35
End: 2020-03-24

## 2020-03-24 NOTE — TELEPHONE ENCOUNTER
From: [facility] SCOTT Heath  To: Isabel Miller  Sent: 3/24/2020 2:11 PM CDT  Subject: Bibi Burton 0933,    I tried reaching out last week but haven't heard from you.   Since we haven't touched base in awhile, need to have a phone discussion to

## 2020-03-24 NOTE — TELEPHONE ENCOUNTER
Will need to wait until Dr. Mallika Banegas comes back as other providers will be out on labor pool.

## 2020-03-27 ENCOUNTER — TELEPHONE (OUTPATIENT)
Dept: FAMILY MEDICINE CLINIC | Facility: CLINIC | Age: 35
End: 2020-03-27

## 2020-03-27 DIAGNOSIS — Z97.5 NEXPLANON IN PLACE: Primary | ICD-10-CM

## 2020-03-27 PROCEDURE — 99212 OFFICE O/P EST SF 10 MIN: CPT | Performed by: PHYSICIAN ASSISTANT

## 2020-03-27 NOTE — TELEPHONE ENCOUNTER
Spoke with patient. See telephone message. Would like to get started with Nexplanon prior authorization ASAP.

## 2020-03-27 NOTE — TELEPHONE ENCOUNTER
Virtual Check-In    Stefani Be verbally consents to a [employer] on 03/27/20. Patient understands and accepts financial responsibility for any deductible, co-insurance and/or co-pays associated with this service.     Duration of the se

## 2020-04-05 ENCOUNTER — TELEPHONE (OUTPATIENT)
Dept: INTERNAL MEDICINE CLINIC | Facility: CLINIC | Age: 35
End: 2020-04-05

## 2020-04-05 DIAGNOSIS — Z20.822 SUSPECTED 2019 NOVEL CORONAVIRUS INFECTION: Primary | ICD-10-CM
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
## 2020-04-06 ENCOUNTER — LAB ENCOUNTER (OUTPATIENT)
Dept: LAB | Facility: HOSPITAL | Age: 35
End: 2020-04-06
Attending: FAMILY MEDICINE
Payer: COMMERCIAL

## 2020-04-06 DIAGNOSIS — Z20.822 SUSPECTED 2019 NOVEL CORONAVIRUS INFECTION: ICD-10-CM

## 2020-04-07 ENCOUNTER — TELEPHONE (OUTPATIENT)
Dept: PHYSICAL MEDICINE AND REHAB | Age: 35
End: 2020-04-07

## 2020-04-07 ENCOUNTER — TELEPHONE (OUTPATIENT)
Dept: SCHEDULING | Age: 35
End: 2020-04-07

## 2020-04-20 DIAGNOSIS — F41.9 ANXIETY: ICD-10-CM

## 2020-04-21 ENCOUNTER — TELEPHONE (OUTPATIENT)
Dept: FAMILY MEDICINE CLINIC | Facility: CLINIC | Age: 35
End: 2020-04-21

## 2020-04-21 DIAGNOSIS — Z30.46 ENCOUNTER FOR SURVEILLANCE OF IMPLANTABLE SUBDERMAL CONTRACEPTIVE: Primary | ICD-10-CM

## 2020-04-21 RX ORDER — DEXTROAMPHETAMINE SACCHARATE, AMPHETAMINE ASPARTATE, DEXTROAMPHETAMINE SULFATE AND AMPHETAMINE SULFATE 5; 5; 5; 5 MG/1; MG/1; MG/1; MG/1
20 TABLET ORAL DAILY
Qty: 30 TABLET | Refills: 0 | OUTPATIENT
Start: 2020-04-21 | End: 2020-05-21

## 2020-04-21 RX ORDER — ALPRAZOLAM 0.5 MG/1
TABLET ORAL
Qty: 60 TABLET | Refills: 1 | OUTPATIENT
Start: 2020-04-21

## 2020-04-21 NOTE — TELEPHONE ENCOUNTER
Patient called back and  transferred to [facility] due to communication through [facility].

## 2020-04-21 NOTE — TELEPHONE ENCOUNTER
Patient called back and  transferred to Zora Govea due to communication through [employer].

## 2020-04-21 NOTE — PROGRESS NOTES
Virtual Telephone Check-In    Lennie Bowen verbally consents to a Virtual/Telephone Check-In visit on 04/21/20. Patient understands and accepts financial responsibility for any deductible, co-insurance and/or co-pays associated with this service.

## 2020-04-21 NOTE — TELEPHONE ENCOUNTER
Removal and replacement of Nexplanon  - In office procedure  CPT 20248 - Removal, with reinsertion, non-biodegradable drug delivery implant.  Etonogestrel implant system, including implant and supplies.   Sergio Kilgore [address] [id number] NEXPLANON® (etonogestrel impl

## 2020-04-23 ENCOUNTER — V-VISIT (OUTPATIENT)
Dept: PHYSICAL MEDICINE AND REHAB | Age: 35
End: 2020-04-23

## 2020-04-23 DIAGNOSIS — F90.8 ATTENTION DEFICIT HYPERACTIVITY DISORDER (ADHD), OTHER TYPE: ICD-10-CM

## 2020-04-23 DIAGNOSIS — F41.9 ANXIETY: ICD-10-CM

## 2020-04-23 DIAGNOSIS — Z78.9 IMPAIRED MOBILITY AND ADLS: ICD-10-CM

## 2020-04-23 DIAGNOSIS — Z89.611 STATUS POST ABOVE-KNEE AMPUTATION OF RIGHT LOWER EXTREMITY (CMD): Primary | ICD-10-CM

## 2020-04-23 DIAGNOSIS — R26.81 GAIT INSTABILITY: ICD-10-CM

## 2020-04-23 DIAGNOSIS — Z74.09 IMPAIRED MOBILITY AND ADLS: ICD-10-CM

## 2020-04-23 PROBLEM — F90.9 ATTENTION DEFICIT HYPERACTIVITY DISORDER (ADHD): Status: ACTIVE | Noted: 2020-04-23

## 2020-04-23 PROCEDURE — 99204 OFFICE O/P NEW MOD 45 MIN: CPT | Performed by: PHYSICAL MEDICINE & REHABILITATION

## 2020-04-23 RX ORDER — DEXTROAMPHETAMINE SACCHARATE, AMPHETAMINE ASPARTATE MONOHYDRATE, DEXTROAMPHETAMINE SULFATE AND AMPHETAMINE SULFATE 5; 5; 5; 5 MG/1; MG/1; MG/1; MG/1
20 CAPSULE, EXTENDED RELEASE ORAL DAILY
COMMUNITY

## 2020-04-23 RX ORDER — ALPRAZOLAM 0.5 MG/1
0.5 TABLET ORAL 2 TIMES DAILY
COMMUNITY

## 2020-04-23 ASSESSMENT — ENCOUNTER SYMPTOMS
NAUSEA: 0
SEIZURES: 0
DIARRHEA: 0
CONSTIPATION: 0
SHORTNESS OF BREATH: 0
WHEEZING: 0
FEVER: 0
EYE PAIN: 0
VOMITING: 0
TREMORS: 0
POLYPHAGIA: 0
CHILLS: 0
LIGHT-HEADEDNESS: 0
COUGH: 0
CHOKING: 0
PHOTOPHOBIA: 0
SPEECH DIFFICULTY: 0
RHINORRHEA: 0
UNEXPECTED WEIGHT CHANGE: 0
DIAPHORESIS: 0
TROUBLE SWALLOWING: 0
HEADACHES: 0
ABDOMINAL DISTENTION: 0
NUMBNESS: 0
SORE THROAT: 0

## 2020-04-23 NOTE — TELEPHONE ENCOUNTER
Nexplanon removal and insertion has be approved through her insurance, please assist the patient in scheduling an appt with [facility]  Thank you

## 2020-04-24 ENCOUNTER — TELEPHONE (OUTPATIENT)
Dept: FAMILY MEDICINE CLINIC | Facility: CLINIC | Age: 35
End: 2020-04-24

## 2020-04-24 DIAGNOSIS — S78.111A ABOVE KNEE AMPUTATION OF RIGHT LOWER EXTREMITY (HCC): Primary | ICD-10-CM

## 2020-04-24 DIAGNOSIS — S88.921S: ICD-10-CM

## 2020-04-24 NOTE — TELEPHONE ENCOUNTER
Jodi Homans from [employer] requesting an updated referral for pt's prosthesis.  Requesting to speak to nurse

## 2020-04-25 DIAGNOSIS — F41.9 ANXIETY: ICD-10-CM

## 2020-04-27 ENCOUNTER — TELEPHONE (OUTPATIENT)
Dept: FAMILY MEDICINE CLINIC | Facility: CLINIC | Age: 35
End: 2020-04-27

## 2020-04-27 NOTE — TELEPHONE ENCOUNTER
[address]ector [address], [address] [facility]  [phone]  SRJOSSIE FROM [facility] THE [facility] DESCRIPTION   03-18-20 1  AK OPEN END SACH  03-18-20 2  TEST SOCKET ABOVE KNEE   03-18-20 1  [address]   06-19-19 1

## 2020-04-27 NOTE — TELEPHONE ENCOUNTER
Charo Aguilar, this is done but last office visit was in September 2019 and she is overdue for follow-up here.   Please arrange a virtual visit with me

## 2020-04-27 NOTE — TELEPHONE ENCOUNTER
I've attached a quote/codes and Dr Riley Avila encounter notes to request the referral needed to fabricate Osie Meres prosthesis.   Please advise if you need any additional information to process this request.      I will also need the [facility] case # on the

## 2020-04-27 NOTE — TELEPHONE ENCOUNTER
Need [employer] to approve referral with date 3/18/20 and to correct S & S location, and amount of each item

## 2020-04-27 NOTE — TELEPHONE ENCOUNTER
last office visit was in September 2019 and she is overdue for follow-up here.   Please arrange a virtual visit with Dr Hannah Chamberlain

## 2020-04-27 NOTE — TELEPHONE ENCOUNTER
Sorry, didn't see virtual visit, saw LOV from 2019. No visit needed.  Thanks and stay well, Fara Chambers MD

## 2020-04-27 NOTE — TELEPHONE ENCOUNTER
Refill request for:    Requested Prescriptions     Pending Prescriptions Disp Refills   • ALPRAZolam 0.5 MG Oral Tab 60 tablet 0     Sig: TAKE 1 TABLET BY MOUTH TWICE NIGHTLY        Last Prescribed Quantity Refills   4/21/20 60 0     LOV 9/10/2019     Laura

## 2020-04-28 ENCOUNTER — TELEPHONE (OUTPATIENT)
Dept: FAMILY MEDICINE CLINIC | Facility: CLINIC | Age: 35
End: 2020-04-28

## 2020-04-28 RX ORDER — ALPRAZOLAM 0.5 MG/1
TABLET ORAL
Qty: 60 TABLET | Refills: 0 | Status: SHIPPED | OUTPATIENT
Start: 2020-04-28 | End: 2020-08-11

## 2020-04-28 NOTE — TELEPHONE ENCOUNTER
Spoke with [facility] and Script should have read Alprazolam bid prn.  Notified Eleuterio's pharmacist.

## 2020-04-29 ENCOUNTER — OFFICE VISIT (OUTPATIENT)
Dept: FAMILY MEDICINE CLINIC | Facility: CLINIC | Age: 35
End: 2020-04-29

## 2020-04-29 VITALS
BODY MASS INDEX: 37.36 KG/M2 | HEART RATE: 86 BPM | RESPIRATION RATE: 16 BRPM | TEMPERATURE: 98 F | HEIGHT: 62 IN | DIASTOLIC BLOOD PRESSURE: 94 MMHG | SYSTOLIC BLOOD PRESSURE: 124 MMHG | WEIGHT: 203 LBS

## 2020-04-29 DIAGNOSIS — Z30.9 ENCOUNTER FOR CONTRACEPTIVE MANAGEMENT, UNSPECIFIED TYPE: Primary | ICD-10-CM

## 2020-04-29 DIAGNOSIS — Z30.46 ENCOUNTER FOR REMOVAL AND REINSERTION OF NEXPLANON: ICD-10-CM

## 2020-04-29 PROBLEM — I26.99 ACUTE PULMONARY EMBOLISM (HCC): Status: RESOLVED | Noted: 2017-07-11 | Resolved: 2020-04-29

## 2020-04-29 PROCEDURE — 11981 INSERTION DRUG DLVR IMPLANT: CPT | Performed by: PHYSICIAN ASSISTANT

## 2020-04-29 PROCEDURE — 81025 URINE PREGNANCY TEST: CPT | Performed by: PHYSICIAN ASSISTANT

## 2020-04-29 PROCEDURE — 11976 REMOVE CONTRACEPTIVE CAPSULE: CPT | Performed by: PHYSICIAN ASSISTANT

## 2020-04-29 NOTE — PROGRESS NOTES
Procedure note   Nexplanon removal and reinsertion:    LMP: 4/14/20  Negative urine pregnancy test today    Informed consent obtained after risks versus benefit of procedure reviewed.  Patient aware of increased risk for hormonal contraception (smoking, age [id number]  Patient tolerated well without complications. Hemostasis obtained. Steri-Strips and compression bandage applied. Home care instructions given. Warning signs reviewed.     Bridget Aragon PA-C

## 2020-05-06 NOTE — TELEPHONE ENCOUNTER
Please call patient and let her know the DME Referral to [employer] has be approved for her prosthetic supplies   We have already faxed the approval to them  Thank you

## 2020-05-06 NOTE — TELEPHONE ENCOUNTER
Contacted [facility] to request Referral #[id number] be approved with all of the corrections   Spoke ot [address] and she contacted the Utilization Dept, they will look at both referrals and I can call back in a few hours to see the status

## 2020-05-08 PROBLEM — Z78.9 IMPAIRED MOBILITY AND ADLS: Status: ACTIVE | Noted: 2020-04-23

## 2020-05-08 PROBLEM — Z74.09 IMPAIRED MOBILITY AND ADLS: Status: ACTIVE | Noted: 2020-04-23

## 2020-05-08 PROBLEM — Z89.611 STATUS POST ABOVE-KNEE AMPUTATION OF RIGHT LOWER EXTREMITY (HCC): Status: ACTIVE | Noted: 2017-07-11

## 2020-05-08 PROBLEM — R26.81 GAIT INSTABILITY: Status: ACTIVE | Noted: 2020-04-23

## 2020-05-08 NOTE — TELEPHONE ENCOUNTER
Insurance is requesting a Letter of Medical Necessity  Letter is PENDING for Dr [place] Review and sign off

## 2020-05-08 NOTE — TELEPHONE ENCOUNTER
Faxed thorough EPIC Letter of Med Necessity  To [facility] Referral Dept  [facility]

## 2020-08-11 DIAGNOSIS — F41.9 ANXIETY: ICD-10-CM

## 2020-08-11 DIAGNOSIS — L40.9 PSORIASIS: ICD-10-CM

## 2020-08-12 NOTE — TELEPHONE ENCOUNTER
Refill request for:    Requested Prescriptions     Pending Prescriptions Disp Refills   • Mometasone Furoate 0.1 % External Cream 45 g 3     Sig: Apply 1 Application topically 2 (two) times daily as needed.    • ALPRAZolam 0.5 MG Oral Tab 60 tablet 0     Si

## 2020-08-13 RX ORDER — MOMETASONE FUROATE 1 MG/G
1 CREAM TOPICAL 2 TIMES DAILY PRN
Qty: 45 G | Refills: 3 | Status: SHIPPED | OUTPATIENT
Start: 2020-08-13 | End: 2021-05-04

## 2020-08-13 RX ORDER — ALPRAZOLAM 0.5 MG/1
TABLET ORAL
Qty: 60 TABLET | Refills: 0 | Status: SHIPPED | OUTPATIENT
Start: 2020-08-13 | End: 2021-05-04

## 2020-09-14 ENCOUNTER — TELEPHONE (OUTPATIENT)
Dept: FAMILY MEDICINE CLINIC | Facility: CLINIC | Age: 35
End: 2020-09-14

## 2020-09-17 ENCOUNTER — MED REC SCAN ONLY (OUTPATIENT)
Dept: FAMILY MEDICINE CLINIC | Facility: CLINIC | Age: 35
End: 2020-09-17

## 2020-10-04 DIAGNOSIS — F41.9 ANXIETY: ICD-10-CM

## 2020-10-08 NOTE — TELEPHONE ENCOUNTER
Called patient and scheduled appointment with Susan Strong for 10/15/20. States has enough medication to get to appt time.

## 2020-10-09 RX ORDER — DEXTROAMPHETAMINE SACCHARATE, AMPHETAMINE ASPARTATE MONOHYDRATE, DEXTROAMPHETAMINE SULFATE AND AMPHETAMINE SULFATE 5; 5; 5; 5 MG/1; MG/1; MG/1; MG/1
20 CAPSULE, EXTENDED RELEASE ORAL DAILY
Qty: 30 CAPSULE | Refills: 0 | OUTPATIENT
Start: 2020-10-09 | End: 2020-11-08

## 2020-10-09 RX ORDER — ALPRAZOLAM 0.5 MG/1
TABLET ORAL
Qty: 60 TABLET | Refills: 0 | OUTPATIENT
Start: 2020-10-09

## 2020-10-09 RX ORDER — DEXTROAMPHETAMINE SACCHARATE, AMPHETAMINE ASPARTATE, DEXTROAMPHETAMINE SULFATE AND AMPHETAMINE SULFATE 5; 5; 5; 5 MG/1; MG/1; MG/1; MG/1
20 TABLET ORAL DAILY
Qty: 30 TABLET | Refills: 0 | OUTPATIENT
Start: 2020-10-09 | End: 2020-11-08

## 2020-10-17 NOTE — PROGRESS NOTES
Patient presents with:  ADHD: med check refill on addeall   Anxiety: refill on xanax  Leg Pain: phantom limb pain right leg      HISTORY OF PRESENT ILLNESS  Jo Bennett is a 28year old year old female who presents to clinic for ADD follow up.  She ha Sodium 75 MG Oral Tab EC, Take 1 tablet (75 mg total) by mouth 2 (two) times daily as needed. , Disp: 30 tablet, Rfl: 2  •  pregabalin 75 MG Oral Cap, Take 1 capsule (75 mg total) by mouth 2 (two) times daily. , Disp: 60 capsule, Rfl: 0  •  ALPRAZolam 0.5 MG Number of children: Not on file      Years of education: Not on file      Highest education level: Not on file    Tobacco Use      Smoking status: Current Every Day Smoker        Packs/day: 0.25        Years: 14.00        Pack years: 3.5      Smokeless tob clinic. Patient will need follow up visit in 3 months.  Sooner for any concerns, weight loss, appetite changes, chest pain/ palpitations, sleep disturbances.    (F41.9) Anxiety  Plan: Refilled xanax for PRN use.     (Z89.611) Status post above-knee amputati

## 2020-11-24 ENCOUNTER — TELEPHONE (OUTPATIENT)
Dept: FAMILY MEDICINE CLINIC | Facility: CLINIC | Age: 35
End: 2020-11-24

## 2020-11-24 NOTE — TELEPHONE ENCOUNTER
Patient's employer is requesting a letter explaining that she is prescribed Xanax and Adderall for ADHD and Anxiety.  Patient would like faxed to [address] [phone]

## 2021-02-09 ENCOUNTER — TELEPHONE (OUTPATIENT)
Dept: FAMILY MEDICINE CLINIC | Facility: CLINIC | Age: 36
End: 2021-02-09

## 2021-02-09 NOTE — TELEPHONE ENCOUNTER
Received medical records request from [employer] requesting all patient's medical records, x-rays and radiology images on CD. All records located in [place] in which request was sent to [employer].  [address] [facility], [address] [facility] Legal Support [phone]  [place]

## 2021-05-04 ENCOUNTER — OFFICE VISIT (OUTPATIENT)
Dept: FAMILY MEDICINE CLINIC | Facility: CLINIC | Age: 36
End: 2021-05-04

## 2021-05-04 VITALS
BODY MASS INDEX: 35.7 KG/M2 | HEART RATE: 96 BPM | RESPIRATION RATE: 16 BRPM | WEIGHT: 194 LBS | SYSTOLIC BLOOD PRESSURE: 136 MMHG | OXYGEN SATURATION: 97 % | TEMPERATURE: 98 F | DIASTOLIC BLOOD PRESSURE: 80 MMHG | HEIGHT: 62 IN

## 2021-05-04 DIAGNOSIS — L40.9 PSORIASIS: ICD-10-CM

## 2021-05-04 DIAGNOSIS — F90.2 ATTENTION DEFICIT HYPERACTIVITY DISORDER (ADHD), COMBINED TYPE: ICD-10-CM

## 2021-05-04 DIAGNOSIS — J45.21 MILD INTERMITTENT ASTHMA WITH ACUTE EXACERBATION: ICD-10-CM

## 2021-05-04 DIAGNOSIS — Z89.611 STATUS POST ABOVE-KNEE AMPUTATION OF RIGHT LOWER EXTREMITY (HCC): Primary | ICD-10-CM

## 2021-05-04 DIAGNOSIS — F41.9 ANXIETY: ICD-10-CM

## 2021-05-04 PROCEDURE — 99214 OFFICE O/P EST MOD 30 MIN: CPT | Performed by: FAMILY MEDICINE

## 2021-05-04 PROCEDURE — 3008F BODY MASS INDEX DOCD: CPT | Performed by: FAMILY MEDICINE

## 2021-05-04 PROCEDURE — 3075F SYST BP GE 130 - 139MM HG: CPT | Performed by: FAMILY MEDICINE

## 2021-05-04 PROCEDURE — 3079F DIAST BP 80-89 MM HG: CPT | Performed by: FAMILY MEDICINE

## 2021-05-04 RX ORDER — ALBUTEROL SULFATE 90 UG/1
2 AEROSOL, METERED RESPIRATORY (INHALATION) EVERY 4 HOURS PRN
Qty: 1 INHALER | Refills: 3 | Status: SHIPPED | OUTPATIENT
Start: 2021-05-04

## 2021-05-04 RX ORDER — DEXTROAMPHETAMINE SACCHARATE, AMPHETAMINE ASPARTATE MONOHYDRATE, DEXTROAMPHETAMINE SULFATE AND AMPHETAMINE SULFATE 5; 5; 5; 5 MG/1; MG/1; MG/1; MG/1
20 CAPSULE, EXTENDED RELEASE ORAL DAILY
Qty: 30 CAPSULE | Refills: 0 | Status: SHIPPED | OUTPATIENT
Start: 2021-05-04 | End: 2021-06-03

## 2021-05-04 RX ORDER — DEXTROAMPHETAMINE SACCHARATE, AMPHETAMINE ASPARTATE MONOHYDRATE, DEXTROAMPHETAMINE SULFATE AND AMPHETAMINE SULFATE 5; 5; 5; 5 MG/1; MG/1; MG/1; MG/1
20 CAPSULE, EXTENDED RELEASE ORAL DAILY
Qty: 30 CAPSULE | Refills: 0 | Status: SHIPPED | OUTPATIENT
Start: 2021-06-04 | End: 2021-07-04

## 2021-05-04 RX ORDER — DEXTROAMPHETAMINE SACCHARATE, AMPHETAMINE ASPARTATE, DEXTROAMPHETAMINE SULFATE AND AMPHETAMINE SULFATE 5; 5; 5; 5 MG/1; MG/1; MG/1; MG/1
20 TABLET ORAL DAILY
Qty: 30 TABLET | Refills: 0 | Status: SHIPPED | OUTPATIENT
Start: 2021-06-29 | End: 2021-10-21 | Stop reason: DRUGHIGH

## 2021-05-04 RX ORDER — MOMETASONE FUROATE 1 MG/G
1 CREAM TOPICAL 2 TIMES DAILY PRN
Qty: 45 G | Refills: 3 | Status: SHIPPED | OUTPATIENT
Start: 2021-05-04 | End: 2021-10-21

## 2021-05-04 RX ORDER — DEXTROAMPHETAMINE SACCHARATE, AMPHETAMINE ASPARTATE, DEXTROAMPHETAMINE SULFATE AND AMPHETAMINE SULFATE 5; 5; 5; 5 MG/1; MG/1; MG/1; MG/1
20 TABLET ORAL DAILY
Qty: 30 TABLET | Refills: 0 | Status: CANCELLED | OUTPATIENT
Start: 2021-05-04 | End: 2021-06-03

## 2021-05-04 RX ORDER — DEXTROAMPHETAMINE SACCHARATE, AMPHETAMINE ASPARTATE, DEXTROAMPHETAMINE SULFATE AND AMPHETAMINE SULFATE 5; 5; 5; 5 MG/1; MG/1; MG/1; MG/1
20 TABLET ORAL DAILY
Qty: 30 TABLET | Refills: 0 | Status: SHIPPED | OUTPATIENT
Start: 2021-05-04 | End: 2021-06-01

## 2021-05-04 RX ORDER — DEXTROAMPHETAMINE SACCHARATE, AMPHETAMINE ASPARTATE MONOHYDRATE, DEXTROAMPHETAMINE SULFATE AND AMPHETAMINE SULFATE 5; 5; 5; 5 MG/1; MG/1; MG/1; MG/1
20 CAPSULE, EXTENDED RELEASE ORAL DAILY
Qty: 30 CAPSULE | Refills: 0 | Status: SHIPPED | OUTPATIENT
Start: 2021-07-05 | End: 2021-10-21 | Stop reason: DRUGHIGH

## 2021-05-04 RX ORDER — DEXTROAMPHETAMINE SACCHARATE, AMPHETAMINE ASPARTATE, DEXTROAMPHETAMINE SULFATE AND AMPHETAMINE SULFATE 5; 5; 5; 5 MG/1; MG/1; MG/1; MG/1
20 TABLET ORAL DAILY
Qty: 30 TABLET | Refills: 0 | Status: SHIPPED | OUTPATIENT
Start: 2021-06-01 | End: 2021-06-29

## 2021-05-04 RX ORDER — ALPRAZOLAM 0.5 MG/1
TABLET ORAL
Qty: 60 TABLET | Refills: 0 | Status: SHIPPED | OUTPATIENT
Start: 2021-05-04 | End: 2021-08-04

## 2021-05-04 RX ORDER — ALBUTEROL SULFATE 2.5 MG/3ML
2.5 SOLUTION RESPIRATORY (INHALATION) AS NEEDED
Qty: 90 AMPULE | Refills: 1 | Status: SHIPPED | OUTPATIENT
Start: 2021-05-04
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
## 2021-05-06 ENCOUNTER — TELEPHONE (OUTPATIENT)
Dept: FAMILY MEDICINE CLINIC | Facility: CLINIC | Age: 36
End: 2021-05-06

## 2021-05-06 NOTE — TELEPHONE ENCOUNTER
Lukas Miller the Pharmacist is calling for dosage and directions it is a bit confusing  for Alprazolam and Albuterol please clarify.

## 2021-05-06 NOTE — TELEPHONE ENCOUNTER
Called [facility] and corrected the alprazolam to 1 tab BID prn and the albuterol via nebulizer q6h prn

## 2021-05-12 ENCOUNTER — TELEPHONE (OUTPATIENT)
Dept: FAMILY MEDICINE CLINIC | Facility: CLINIC | Age: 36
End: 2021-05-12

## 2021-05-12 DIAGNOSIS — N92.1 BREAKTHROUGH BLEEDING ON NEXPLANON: ICD-10-CM

## 2021-05-12 DIAGNOSIS — Z97.5 BREAKTHROUGH BLEEDING ON NEXPLANON: ICD-10-CM

## 2021-05-12 RX ORDER — NORGESTIMATE AND ETHINYL ESTRADIOL 0.25-0.035
1 KIT ORAL DAILY
Qty: 3 PACKAGE | Refills: 1 | Status: SHIPPED | OUTPATIENT
Start: 2021-05-12

## 2021-05-12 NOTE — TELEPHONE ENCOUNTER
Patient calling, patient had requested Dr. Dhaval Guallpa send in a prescription for birth control Sprintec, and in message from 05/07/21n Dr. Dhaval Guallpa stated she would send in for her, was not yet sent.  Please see if prescription can be sent to [facility]

## 2021-08-04 DIAGNOSIS — F90.2 ATTENTION DEFICIT HYPERACTIVITY DISORDER (ADHD), COMBINED TYPE: ICD-10-CM

## 2021-08-04 DIAGNOSIS — F41.9 ANXIETY: ICD-10-CM

## 2021-08-06 ENCOUNTER — TELEPHONE (OUTPATIENT)
Dept: FAMILY MEDICINE CLINIC | Facility: CLINIC | Age: 36
End: 2021-08-06

## 2021-08-06 DIAGNOSIS — Z89.612 STATUS POST BILATERAL ABOVE KNEE AMPUTATION (HCC): Primary | ICD-10-CM

## 2021-08-06 DIAGNOSIS — Z89.611 STATUS POST BILATERAL ABOVE KNEE AMPUTATION (HCC): Primary | ICD-10-CM

## 2021-08-06 NOTE — TELEPHONE ENCOUNTER
Refill request for:    Requested Prescriptions     Pending Prescriptions Disp Refills   • ALPRAZolam 0.5 MG Oral Tab 60 tablet 0     Sig: TAKE 1 TABLET BY MOUTH TWICE NIGHTLY   • Amphetamine-Dextroamphet ER (ADDERALL XR) 20 MG Oral Capsule SR 24 Hr 30 caps

## 2021-08-06 NOTE — TELEPHONE ENCOUNTER
Referral request [facility] and [facility] [employer]  Fax number: [phone]     right side quantity 1   right side quantity 1   right side quantity 1   right side quantity 2   right side quantity 1   right side quantity 1   right

## 2021-08-09 NOTE — TELEPHONE ENCOUNTER
Pt informed to schedule an appt for med refill 3 to 6 mo out but she is requesting to speak to a nurse as he states she usaully comes in every 6 mo not 3 mo. Why is this appt needed?  I explained to her but still wants to talk to a nurse first.

## 2021-08-12 RX ORDER — DEXTROAMPHETAMINE SACCHARATE, AMPHETAMINE ASPARTATE MONOHYDRATE, DEXTROAMPHETAMINE SULFATE AND AMPHETAMINE SULFATE 5; 5; 5; 5 MG/1; MG/1; MG/1; MG/1
20 CAPSULE, EXTENDED RELEASE ORAL DAILY
Qty: 30 CAPSULE | Refills: 0 | OUTPATIENT
Start: 2021-08-12 | End: 2021-09-11

## 2021-08-12 RX ORDER — DEXTROAMPHETAMINE SACCHARATE, AMPHETAMINE ASPARTATE, DEXTROAMPHETAMINE SULFATE AND AMPHETAMINE SULFATE 5; 5; 5; 5 MG/1; MG/1; MG/1; MG/1
20 TABLET ORAL DAILY
Qty: 30 TABLET | Refills: 0 | OUTPATIENT
Start: 2021-08-12 | End: 2021-09-09

## 2021-08-12 NOTE — TELEPHONE ENCOUNTER
Adderall is refilled at appointments Denied at this time  Forward request for alprazolam to Dr Valencia Pat

## 2021-08-14 RX ORDER — DEXTROAMPHETAMINE SACCHARATE, AMPHETAMINE ASPARTATE MONOHYDRATE, DEXTROAMPHETAMINE SULFATE AND AMPHETAMINE SULFATE 5; 5; 5; 5 MG/1; MG/1; MG/1; MG/1
20 CAPSULE, EXTENDED RELEASE ORAL DAILY
Qty: 30 CAPSULE | Refills: 0 | Status: SHIPPED | OUTPATIENT
Start: 2021-08-14 | End: 2021-10-21 | Stop reason: DRUGHIGH

## 2021-08-14 RX ORDER — DEXTROAMPHETAMINE SACCHARATE, AMPHETAMINE ASPARTATE, DEXTROAMPHETAMINE SULFATE AND AMPHETAMINE SULFATE 5; 5; 5; 5 MG/1; MG/1; MG/1; MG/1
20 TABLET ORAL DAILY
Qty: 30 TABLET | Refills: 0 | Status: SHIPPED | OUTPATIENT
Start: 2021-08-14 | End: 2021-10-21 | Stop reason: DRUGHIGH

## 2021-08-14 RX ORDER — ALPRAZOLAM 0.5 MG/1
TABLET ORAL
Qty: 60 TABLET | Refills: 0 | Status: SHIPPED | OUTPATIENT
Start: 2021-08-14 | End: 2021-10-21

## 2021-08-14 NOTE — TELEPHONE ENCOUNTER
Rafilled adderall because she did not fill her last script. Xanax refilled. Please notify patient.   Moriah Hardin, DO

## 2021-10-14 ENCOUNTER — TELEPHONE (OUTPATIENT)
Dept: FAMILY MEDICINE CLINIC | Facility: CLINIC | Age: 36
End: 2021-10-14

## 2021-10-14 DIAGNOSIS — Z00.00 LABORATORY EXAMINATION ORDERED AS PART OF A COMPLETE PHYSICAL EXAMINATION: Primary | ICD-10-CM

## 2021-10-14 NOTE — TELEPHONE ENCOUNTER
Please enter lab orders for the patient's upcoming physical appointment. Patient also requesting her iron levels be tested, states she has low iron          Physical scheduled: Your appointments     Date & Time Appointment Department [facility])    Oct 21, 2021  1:15 PM CDT Physical - Established with SCOTT Rothman [address]  ([address])            [address] [facility],  [address] [place] [address] [phone]         [facility]  [facility])     The patient has been notified to complete fasting labs prior to their physical appointment.

## 2021-10-21 ENCOUNTER — OFFICE VISIT (OUTPATIENT)
Dept: FAMILY MEDICINE CLINIC | Facility: CLINIC | Age: 36
End: 2021-10-21

## 2021-10-21 VITALS
WEIGHT: 198 LBS | HEART RATE: 77 BPM | BODY MASS INDEX: 35.08 KG/M2 | HEIGHT: 63 IN | DIASTOLIC BLOOD PRESSURE: 98 MMHG | TEMPERATURE: 98 F | SYSTOLIC BLOOD PRESSURE: 142 MMHG | RESPIRATION RATE: 16 BRPM

## 2021-10-21 DIAGNOSIS — L40.9 PSORIASIS: ICD-10-CM

## 2021-10-21 DIAGNOSIS — Z00.00 WELL ADULT EXAM: Primary | ICD-10-CM

## 2021-10-21 DIAGNOSIS — F41.9 ANXIETY: ICD-10-CM

## 2021-10-21 DIAGNOSIS — F90.2 ATTENTION DEFICIT HYPERACTIVITY DISORDER (ADHD), COMBINED TYPE: ICD-10-CM

## 2021-10-21 DIAGNOSIS — S88.921S: ICD-10-CM

## 2021-10-21 PROCEDURE — 3080F DIAST BP >= 90 MM HG: CPT | Performed by: PHYSICIAN ASSISTANT

## 2021-10-21 PROCEDURE — 90686 IIV4 VACC NO PRSV 0.5 ML IM: CPT | Performed by: PHYSICIAN ASSISTANT

## 2021-10-21 PROCEDURE — 99395 PREV VISIT EST AGE 18-39: CPT | Performed by: PHYSICIAN ASSISTANT

## 2021-10-21 PROCEDURE — 3008F BODY MASS INDEX DOCD: CPT | Performed by: PHYSICIAN ASSISTANT

## 2021-10-21 PROCEDURE — 3077F SYST BP >= 140 MM HG: CPT | Performed by: PHYSICIAN ASSISTANT

## 2021-10-21 PROCEDURE — 90471 IMMUNIZATION ADMIN: CPT | Performed by: PHYSICIAN ASSISTANT

## 2021-10-21 RX ORDER — DEXTROAMPHETAMINE SACCHARATE, AMPHETAMINE ASPARTATE, DEXTROAMPHETAMINE SULFATE AND AMPHETAMINE SULFATE 5; 5; 5; 5 MG/1; MG/1; MG/1; MG/1
20 TABLET ORAL DAILY
Qty: 30 TABLET | Refills: 0 | Status: SHIPPED | OUTPATIENT
Start: 2021-11-21 | End: 2021-12-21

## 2021-10-21 RX ORDER — DEXTROAMPHETAMINE SACCHARATE, AMPHETAMINE ASPARTATE MONOHYDRATE, DEXTROAMPHETAMINE SULFATE AND AMPHETAMINE SULFATE 5; 5; 5; 5 MG/1; MG/1; MG/1; MG/1
20 CAPSULE, EXTENDED RELEASE ORAL DAILY
Qty: 30 CAPSULE | Refills: 0 | Status: SHIPPED | OUTPATIENT
Start: 2021-10-21 | End: 2021-11-20

## 2021-10-21 RX ORDER — ALPRAZOLAM 0.5 MG/1
TABLET ORAL
Qty: 60 TABLET | Refills: 0 | Status: SHIPPED | OUTPATIENT
Start: 2021-10-21

## 2021-10-21 RX ORDER — DEXTROAMPHETAMINE SACCHARATE, AMPHETAMINE ASPARTATE MONOHYDRATE, DEXTROAMPHETAMINE SULFATE AND AMPHETAMINE SULFATE 5; 5; 5; 5 MG/1; MG/1; MG/1; MG/1
20 CAPSULE, EXTENDED RELEASE ORAL DAILY
Qty: 30 CAPSULE | Refills: 0 | Status: SHIPPED | OUTPATIENT
Start: 2021-12-22 | End: 2022-01-21

## 2021-10-21 RX ORDER — FLUOXETINE 10 MG/1
10 CAPSULE ORAL DAILY
Qty: 90 CAPSULE | Refills: 0 | Status: SHIPPED | OUTPATIENT
Start: 2021-10-21

## 2021-10-21 RX ORDER — DEXTROAMPHETAMINE SACCHARATE, AMPHETAMINE ASPARTATE, DEXTROAMPHETAMINE SULFATE AND AMPHETAMINE SULFATE 5; 5; 5; 5 MG/1; MG/1; MG/1; MG/1
20 TABLET ORAL DAILY
Qty: 30 TABLET | Refills: 0 | Status: SHIPPED | OUTPATIENT
Start: 2021-12-22 | End: 2022-01-21

## 2021-10-21 RX ORDER — DEXTROAMPHETAMINE SACCHARATE, AMPHETAMINE ASPARTATE, DEXTROAMPHETAMINE SULFATE AND AMPHETAMINE SULFATE 5; 5; 5; 5 MG/1; MG/1; MG/1; MG/1
20 TABLET ORAL DAILY
Qty: 30 TABLET | Refills: 0 | Status: SHIPPED | OUTPATIENT
Start: 2021-10-21 | End: 2021-11-20

## 2021-10-21 RX ORDER — MOMETASONE FUROATE 1 MG/G
1 CREAM TOPICAL 2 TIMES DAILY PRN
Qty: 45 G | Refills: 3 | Status: SHIPPED | OUTPATIENT
Start: 2021-10-21 | End: 2022-10-16

## 2021-10-21 RX ORDER — DEXTROAMPHETAMINE SACCHARATE, AMPHETAMINE ASPARTATE, DEXTROAMPHETAMINE SULFATE AND AMPHETAMINE SULFATE 5; 5; 5; 5 MG/1; MG/1; MG/1; MG/1
TABLET ORAL
COMMUNITY
End: 2021-10-21 | Stop reason: DRUGHIGH

## 2021-10-21 RX ORDER — DEXTROAMPHETAMINE SACCHARATE, AMPHETAMINE ASPARTATE, DEXTROAMPHETAMINE SULFATE AND AMPHETAMINE SULFATE 5; 5; 5; 5 MG/1; MG/1; MG/1; MG/1
TABLET ORAL
Refills: 0 | Status: CANCELLED | OUTPATIENT
Start: 2021-10-21

## 2021-10-21 RX ORDER — DEXTROAMPHETAMINE SACCHARATE, AMPHETAMINE ASPARTATE MONOHYDRATE, DEXTROAMPHETAMINE SULFATE AND AMPHETAMINE SULFATE 5; 5; 5; 5 MG/1; MG/1; MG/1; MG/1
20 CAPSULE, EXTENDED RELEASE ORAL DAILY
Qty: 30 CAPSULE | Refills: 0 | Status: SHIPPED | OUTPATIENT
Start: 2021-11-21 | End: 2021-12-21

## 2021-10-25 NOTE — PROGRESS NOTES
DATE OF EXAM  10/21/2021    CHIEF COMPLAINT/REASON FOR VISIT  Annual exam.    HISTORY OF PRESENT ILLNESS  Zara Shah presents today for routine annual physical examination. Needs work physical today. Starting new job. Requests Adderall refills.  Naeem Floyd Current Every Day Smoker        Packs/day: 0.25        Years: 14.00        Pack years: 3.5      Smokeless tobacco: Never Used      Tobacco comment: 1/4 PPD    Vaping Use      Vaping Use: Never used    Substance and Sexual Activity      Alcohol use:  Yes in bowel movements. No black or tarry stools or blood in the stool. : No increased frequency or urgency. No hematuria. No menstrual problems. BREASTS:  Patient denies any lumps, bumps, discharge, or pain. PSYCH: No anxiety or depression.  No suicidal maintenance is up to date as shown above. Recommend complete physical exams every year. 2. Psoriasis  Refilled mometasone for PRN use. - Mometasone Furoate 0.1 % External Cream; Apply 1 Application topically 2 (two) times daily as needed.   Dispense: 4

## 2022-04-08 ENCOUNTER — TELEPHONE (OUTPATIENT)
Dept: FAMILY MEDICINE CLINIC | Facility: CLINIC | Age: 37
End: 2022-04-08

## 2022-07-01 ENCOUNTER — TELEMEDICINE (OUTPATIENT)
Dept: FAMILY MEDICINE CLINIC | Facility: CLINIC | Age: 37
End: 2022-07-01

## 2022-07-01 DIAGNOSIS — F41.9 ANXIETY: ICD-10-CM

## 2022-07-01 DIAGNOSIS — H92.01 RIGHT EAR PAIN: Primary | ICD-10-CM

## 2022-07-01 DIAGNOSIS — R42 VERTIGO: ICD-10-CM

## 2022-07-01 DIAGNOSIS — J01.00 ACUTE NON-RECURRENT MAXILLARY SINUSITIS: ICD-10-CM

## 2022-07-01 RX ORDER — ONDANSETRON 4 MG/1
4 TABLET, FILM COATED ORAL EVERY 8 HOURS PRN
Qty: 20 TABLET | Refills: 0 | Status: SHIPPED | OUTPATIENT
Start: 2022-07-01

## 2022-07-01 RX ORDER — AMOXICILLIN AND CLAVULANATE POTASSIUM 875; 125 MG/1; MG/1
1 TABLET, FILM COATED ORAL 2 TIMES DAILY
Qty: 20 TABLET | Refills: 0 | Status: SHIPPED | OUTPATIENT
Start: 2022-07-01 | End: 2022-07-11

## 2022-07-01 RX ORDER — PREDNISONE 20 MG/1
40 TABLET ORAL DAILY
Qty: 10 TABLET | Refills: 0 | Status: SHIPPED | OUTPATIENT
Start: 2022-07-01 | End: 2022-07-06

## 2022-07-01 RX ORDER — ALPRAZOLAM 0.5 MG/1
TABLET ORAL
Qty: 60 TABLET | Refills: 0 | Status: SHIPPED | OUTPATIENT
Start: 2022-07-01

## 2022-08-31 NOTE — TELEPHONE ENCOUNTER
Received medical records request from [employer] requesting all records.  All records in [address] [place] [address] in which all progress notes, labs & medication list was printed and mailed to:    [employer].  [address]  65 y/o female w/ hx htn p/w b/l knee pain (R>L) x 6 wks which started after receiving French massage where knees were hyperflexed.  Saw orthopedist recently who did xrays which showed arthritis.  States was given anti-inflammatories and advised to do exercises at home.  Has been trying exercises and over past 2 wks, pain to R knee has worsened.  Denies new fall/trauma/injury.  Denies f/c, cp, sob, numbness/tingling/weakness to ext.  No hx smoking, long travel, exogenous hormone use, or hx blood clots.

## 2023-01-13 ENCOUNTER — TELEPHONE (OUTPATIENT)
Dept: FAMILY MEDICINE CLINIC | Facility: CLINIC | Age: 38
End: 2023-01-13

## 2023-01-13 DIAGNOSIS — Z89.611 STATUS POST BILATERAL ABOVE KNEE AMPUTATION (HCC): Primary | ICD-10-CM

## 2023-01-13 DIAGNOSIS — Z89.612 STATUS POST BILATERAL ABOVE KNEE AMPUTATION (HCC): Primary | ICD-10-CM

## 2023-01-13 NOTE — TELEPHONE ENCOUNTER
Referral request DME  Diagnosis: [address]   right side quantity 1   right side quantity 1   right side quantity 6   right side quantity 1   right side quantity 2   right side quantity 1   right side quantity 1   right side quantity 1   right side quantity 1   right side quantity 1   right side quantity 1   right side quantity 6

## 2023-01-13 NOTE — TELEPHONE ENCOUNTER
Left message for patient that she requires a visit with Dr. Ayan Ramirez M.D. to get documentation needed for new socket.

## 2023-01-13 NOTE — TELEPHONE ENCOUNTER
Jayjay Mcguire from [facility] called regarding a letter for medical necessity for insurance to cover a prosthetic socket. Jayjay Mcguire stated he faxed over information regarding this and a letter of medical necessity is needed.  Fax [phone]

## 2023-01-16 NOTE — TELEPHONE ENCOUNTER
Left message once again that patient will require an appt to get documentation for insurance to cover new socket.

## 2023-01-18 NOTE — TELEPHONE ENCOUNTER
Dr. Navarro Elder patient has appt 1/19/23    Please include the following notes in your documentation so insurance will cover her new socket. Patient is wearing a prosthetic socket she received in July of 2018. Since delivery, patient's limb volume has changed and she improved her functional gait. Patient wears prosthesis every day and is a very active user. 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 Patient works on her feet all day and must be ready to respond quickly. Patient also has a son who she is a full time care giver for. Patient is independent in all personal care, maintains her home, and shops for home supplies. The patient is motivated, willing to use and has demonstrated the ability to use a prosthesis at a highly functional level. The patient is expected to return to a level that should include most pre-amputation activities. The patient requires a new prosthetic socket due to limb volume change and skin breakdown. Patient's current socket and liner are fitting poorly causing blistering on distal end and proximal edge of the socket. Modifications to the socket including adding padding heating to flare edges, and add gel spots in liner were tried. Adjustments were not sufficient and patient is still suffering from blisters. New socket is required to improve control and prevent skin breakdown.

## 2023-01-19 ENCOUNTER — OFFICE VISIT (OUTPATIENT)
Dept: FAMILY MEDICINE CLINIC | Facility: CLINIC | Age: 38
End: 2023-01-19
Payer: COMMERCIAL

## 2023-01-19 VITALS
HEIGHT: 62 IN | BODY MASS INDEX: 39.34 KG/M2 | DIASTOLIC BLOOD PRESSURE: 70 MMHG | SYSTOLIC BLOOD PRESSURE: 134 MMHG | WEIGHT: 213.81 LBS | HEART RATE: 80 BPM | RESPIRATION RATE: 18 BRPM

## 2023-01-19 DIAGNOSIS — Z00.00 ANNUAL PHYSICAL EXAM: Primary | ICD-10-CM

## 2023-01-19 DIAGNOSIS — F90.2 ATTENTION DEFICIT HYPERACTIVITY DISORDER (ADHD), COMBINED TYPE: ICD-10-CM

## 2023-01-19 DIAGNOSIS — F33.1 MAJOR DEPRESSIVE DISORDER, RECURRENT EPISODE, MODERATE (HCC): ICD-10-CM

## 2023-01-19 DIAGNOSIS — R73.9 HYPERGLYCEMIA: ICD-10-CM

## 2023-01-19 DIAGNOSIS — E66.01 SEVERE OBESITY (BMI 35.0-35.9 WITH COMORBIDITY) (HCC): ICD-10-CM

## 2023-01-19 DIAGNOSIS — Z89.611 STATUS POST ABOVE-KNEE AMPUTATION OF RIGHT LOWER EXTREMITY (HCC): ICD-10-CM

## 2023-01-19 DIAGNOSIS — Z00.00 LABORATORY EXAMINATION ORDERED AS PART OF A ROUTINE GENERAL MEDICAL EXAMINATION: ICD-10-CM

## 2023-01-19 DIAGNOSIS — F41.9 ANXIETY: ICD-10-CM

## 2023-01-19 DIAGNOSIS — J45.21 MILD INTERMITTENT ASTHMA WITH ACUTE EXACERBATION: ICD-10-CM

## 2023-01-19 DIAGNOSIS — S78.111A ABOVE KNEE AMPUTATION OF RIGHT LOWER EXTREMITY (HCC): ICD-10-CM

## 2023-01-19 DIAGNOSIS — L40.9 PSORIASIS: ICD-10-CM

## 2023-01-19 PROCEDURE — 3078F DIAST BP <80 MM HG: CPT | Performed by: FAMILY MEDICINE

## 2023-01-19 PROCEDURE — 99395 PREV VISIT EST AGE 18-39: CPT | Performed by: FAMILY MEDICINE

## 2023-01-19 PROCEDURE — 3075F SYST BP GE 130 - 139MM HG: CPT | Performed by: FAMILY MEDICINE

## 2023-01-19 PROCEDURE — 3008F BODY MASS INDEX DOCD: CPT | Performed by: FAMILY MEDICINE

## 2023-01-19 RX ORDER — ALPRAZOLAM 0.5 MG/1
TABLET ORAL
Qty: 60 TABLET | Refills: 3 | Status: SHIPPED | OUTPATIENT
Start: 2023-01-19

## 2023-01-19 RX ORDER — CLOBETASOL PROPIONATE 0.5 MG/G
1 CREAM TOPICAL 2 TIMES DAILY
Qty: 60 G | Refills: 11 | Status: SHIPPED | OUTPATIENT
Start: 2023-01-19

## 2023-01-19 RX ORDER — METFORMIN HYDROCHLORIDE 500 MG/1
500 TABLET, EXTENDED RELEASE ORAL DAILY
Qty: 90 TABLET | Refills: 1 | Status: SHIPPED | OUTPATIENT
Start: 2023-01-19

## 2023-01-19 NOTE — ASSESSMENT & PLAN NOTE
Patient is wearing a prosthetic socket she received in July of 2018. Since delivery, patient's limb volume has changed and she improved her functional gait. Patient wears prosthesis every day and is a very active user. 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 Patient works on her feet all day and must be ready to respond quickly. Patient also has a son who she is a full time care giver for. Patient is independent in all personal care, maintains her home, and shops for home supplies. The patient is motivated, willing to use and has demonstrated the ability to use a prosthesis at a highly functional level. The patient is expected to return to a level that should include most pre-amputation activities. The patient requires a new prosthetic socket due to limb volume change and skin breakdown. Patient's current socket and liner are fitting poorly causing blistering on distal end and proximal edge of the socket. Modifications to the socket including adding padding heating to flare edges, and add gel spots in liner were tried. Adjustments were not sufficient and patient is still suffering from blisters. New socket is required to improve control and prevent skin breakdown.

## 2023-08-14 ENCOUNTER — TELEPHONE (OUTPATIENT)
Dept: FAMILY MEDICINE CLINIC | Facility: CLINIC | Age: 38
End: 2023-08-14

## 2023-08-29 ENCOUNTER — TELEPHONE (OUTPATIENT)
Dept: FAMILY MEDICINE CLINIC | Facility: CLINIC | Age: 38
End: 2023-08-29

## 2023-08-29 DIAGNOSIS — Z97.5 BREAKTHROUGH BLEEDING ON NEXPLANON: ICD-10-CM

## 2023-08-29 DIAGNOSIS — N92.1 BREAKTHROUGH BLEEDING ON NEXPLANON: ICD-10-CM

## 2023-08-29 RX ORDER — NORGESTIMATE AND ETHINYL ESTRADIOL 0.25-0.035
1 KIT ORAL DAILY
Qty: 84 TABLET | Refills: 0 | Status: SHIPPED | OUTPATIENT
Start: 2023-08-29

## 2023-08-29 RX ORDER — IBUPROFEN 800 MG/1
TABLET ORAL
COMMUNITY
Start: 2023-08-18

## 2023-08-29 RX ORDER — AMOXICILLIN 500 MG/1
CAPSULE ORAL
COMMUNITY
Start: 2023-08-18

## 2023-09-14 ENCOUNTER — OFFICE VISIT (OUTPATIENT)
Dept: FAMILY MEDICINE CLINIC | Facility: CLINIC | Age: 38
End: 2023-09-14
Payer: COMMERCIAL

## 2023-09-14 VITALS
RESPIRATION RATE: 16 BRPM | DIASTOLIC BLOOD PRESSURE: 100 MMHG | OXYGEN SATURATION: 98 % | TEMPERATURE: 97 F | WEIGHT: 219 LBS | SYSTOLIC BLOOD PRESSURE: 160 MMHG | HEART RATE: 90 BPM | BODY MASS INDEX: 40 KG/M2

## 2023-09-14 DIAGNOSIS — Z00.00 LABORATORY EXAMINATION ORDERED AS PART OF A COMPLETE PHYSICAL EXAMINATION: ICD-10-CM

## 2023-09-14 DIAGNOSIS — Z11.3 SCREEN FOR STD (SEXUALLY TRANSMITTED DISEASE): ICD-10-CM

## 2023-09-14 DIAGNOSIS — I10 ESSENTIAL HYPERTENSION: ICD-10-CM

## 2023-09-14 DIAGNOSIS — Z12.4 SCREENING FOR CERVICAL CANCER: ICD-10-CM

## 2023-09-14 DIAGNOSIS — Z30.46 ENCOUNTER FOR REMOVAL AND REINSERTION OF NEXPLANON: Primary | ICD-10-CM

## 2023-09-14 PROCEDURE — 3077F SYST BP >= 140 MM HG: CPT | Performed by: PHYSICIAN ASSISTANT

## 2023-09-14 PROCEDURE — 99214 OFFICE O/P EST MOD 30 MIN: CPT | Performed by: PHYSICIAN ASSISTANT

## 2023-09-14 PROCEDURE — 3080F DIAST BP >= 90 MM HG: CPT | Performed by: PHYSICIAN ASSISTANT

## 2023-09-14 PROCEDURE — 11981 INSERTION DRUG DLVR IMPLANT: CPT | Performed by: PHYSICIAN ASSISTANT

## 2023-09-14 PROCEDURE — 11976 REMOVE CONTRACEPTIVE CAPSULE: CPT | Performed by: PHYSICIAN ASSISTANT

## 2023-09-14 RX ORDER — AMLODIPINE BESYLATE 5 MG/1
5 TABLET ORAL DAILY
Qty: 90 TABLET | Refills: 0 | Status: SHIPPED | OUTPATIENT
Start: 2023-09-14 | End: 2024-09-08

## 2023-09-18 LAB
CHLAMYDIA TRACHOMATIS$RNA, TMA: NOT DETECTED
HPV MRNA E6/E7: NOT DETECTED
NEISSERIA GONORRHOEAE$RNA, TMA: NOT DETECTED

## 2023-09-21 PROBLEM — I10 ESSENTIAL HYPERTENSION: Status: ACTIVE | Noted: 2023-09-21

## 2023-10-17 DIAGNOSIS — L40.9 PSORIASIS: ICD-10-CM

## 2023-10-17 DIAGNOSIS — F41.9 ANXIETY: ICD-10-CM

## 2023-10-21 NOTE — TELEPHONE ENCOUNTER
Refill request for:    Requested Prescriptions     Pending Prescriptions Disp Refills    clobetasol 0.05 % External Cream 60 g 11     Sig: Apply 1 Application  topically 2 (two) times daily.    ALPRAZolam 0.5 MG Oral Tab 60 tablet 3     Sig: TAKE 1 TABLET BY MOUTH TWICE NIGHTLY        Last Prescribed Quantity Refills   Alprazolam  1/19/23   60   3   Clobetasol  1/19/23   60g   11     LOV 9/14/2023     Patient was asked to follow-up in: no follow ups on file.    Appointment scheduled: Visit date not found    Medication not on protocol.       Routed to Hunter Alcantar MD, for review.

## 2023-10-22 RX ORDER — CLOBETASOL PROPIONATE 0.5 MG/G
1 CREAM TOPICAL 2 TIMES DAILY
Qty: 60 G | Refills: 0 | Status: SHIPPED | OUTPATIENT
Start: 2023-10-22

## 2023-10-22 RX ORDER — ALPRAZOLAM 0.5 MG/1
TABLET ORAL
Qty: 60 TABLET | Refills: 0 | Status: SHIPPED | OUTPATIENT
Start: 2023-10-22

## 2024-01-11 DIAGNOSIS — I10 ESSENTIAL HYPERTENSION: ICD-10-CM

## 2024-01-12 RX ORDER — AMLODIPINE BESYLATE 5 MG/1
5 TABLET ORAL DAILY
Qty: 90 TABLET | Refills: 0 | Status: SHIPPED | OUTPATIENT
Start: 2024-01-12 | End: 2025-01-06

## 2024-01-12 NOTE — TELEPHONE ENCOUNTER
Requested Prescriptions     Pending Prescriptions Disp Refills    amLODIPine 5 MG Oral Tab 90 tablet 0     Sig: Take 1 tablet (5 mg total) by mouth daily.     LOV 9/14/2023     Patient was asked to follow-up in: return if symptoms worsen or fail to improve     Appointment scheduled: Visit date not found     Medication failed protocol.    Routed to front staff      Patient is due for their annual physical please call patient and have them schedule an appointment

## 2024-09-19 DIAGNOSIS — L40.9 PSORIASIS: ICD-10-CM

## 2024-09-24 RX ORDER — CLOBETASOL PROPIONATE 0.5 MG/G
1 CREAM TOPICAL 2 TIMES DAILY
Qty: 60 G | Refills: 0 | Status: SHIPPED | OUTPATIENT
Start: 2024-09-24

## 2024-10-14 ENCOUNTER — TELEPHONE (OUTPATIENT)
Dept: FAMILY MEDICINE CLINIC | Facility: CLINIC | Age: 39
End: 2024-10-14

## 2024-10-14 DIAGNOSIS — F41.9 ANXIETY: ICD-10-CM

## 2024-10-14 RX ORDER — ALPRAZOLAM 0.5 MG
0.5 TABLET ORAL 2 TIMES DAILY PRN
Qty: 60 TABLET | Refills: 3 | Status: SHIPPED | OUTPATIENT
Start: 2024-10-14

## 2024-10-14 NOTE — TELEPHONE ENCOUNTER
Someone messed with the Sig, I updated it- please do not allow free text- discrete info is best and avoids this type of situation.

## 2024-10-14 NOTE — TELEPHONE ENCOUNTER
Close reason: Prior Authorization duplicate/in process  Payer: [facility]    [phone]    [phone]  Note from payer: A PA is already in process for this member/drug.  For further inquiries please contact the number on the back of the member prescription card.

## 2024-10-14 NOTE — TELEPHONE ENCOUNTER
Received fax from [employer], prior authorization required for Amphetamine-Dextroamphet ER 20MG ER Capsules    Key# [id number]    Called patient and left message on machine explaining process. Fax placed in Pat's in box

## 2024-10-14 NOTE — TELEPHONE ENCOUNTER
Received fax from [employer], prior authorization required for Amphetamine-Dextroamphet  20MG Tablets     Key# [id number]     Called patient and left message on machine explaining process. Fax placed in Pat's in box

## 2024-10-14 NOTE — TELEPHONE ENCOUNTER
Approved   Amphetamine-Dextroamphet  20MG Tablets     Prior authorization approved  Payer: [facility] Marjorie Case ID: [id number]    [id number] [phone]  Note from payer: Your PA request has been approved.  Additional information will be provided in the approval communication. (Message 1144)  Approval Details    Authorized from October 14, 2024 to October 14, 2027

## 2024-11-01 ENCOUNTER — HOSPITAL ENCOUNTER (OUTPATIENT)
Dept: MAMMOGRAPHY | Age: 39
Discharge: HOME | End: 2024-11-01
Attending: FAMILY MEDICINE
Payer: COMMERCIAL

## 2024-11-01 ENCOUNTER — HOSPITAL ENCOUNTER (OUTPATIENT)
Dept: ULTRASOUND IMAGING | Age: 39
Discharge: HOME | End: 2024-11-01
Attending: FAMILY MEDICINE
Payer: COMMERCIAL

## 2024-11-01 DIAGNOSIS — N63.21 MASS OF UPPER OUTER QUADRANT OF LEFT BREAST: ICD-10-CM

## 2024-11-01 PROCEDURE — 77066 DX MAMMO INCL CAD BI: CPT | Performed by: FAMILY MEDICINE

## 2024-11-01 PROCEDURE — 76642 ULTRASOUND BREAST LIMITED: CPT | Performed by: FAMILY MEDICINE

## 2024-11-01 PROCEDURE — 77062 BREAST TOMOSYNTHESIS BI: CPT | Performed by: FAMILY MEDICINE

## 2024-11-25 ENCOUNTER — PATIENT MESSAGE (OUTPATIENT)
Dept: FAMILY MEDICINE CLINIC | Facility: CLINIC | Age: 39
End: 2024-11-25

## 2025-03-04 ENCOUNTER — TELEPHONE (OUTPATIENT)
Dept: FAMILY MEDICINE CLINIC | Facility: CLINIC | Age: 40
End: 2025-03-04

## 2025-03-04 DIAGNOSIS — Z89.611 ACQUIRED ABSENCE OF RIGHT LEG ABOVE KNEE (HCC): Primary | ICD-10-CM

## 2025-03-04 NOTE — TELEPHONE ENCOUNTER
TC to patient to ask her to make an appt with Dr. Hunter M.D.   We have received note from [facility] to update notes for new prosthetics, will need appt. She was told to return in January of 2025

## 2025-03-04 NOTE — TELEPHONE ENCOUNTER
Dr. Harrison patient is coming in to see you this Friday.   The following needs to be in your documentation according to insurance guidelines.  Could you just copy and paste this into your notes on Friday.      Poly Mathis is currently utilizing a right AK permanent prosthesis in which she reports an ill fitting socket, which is causing instability and discomfort and is at risk at causing tissue breakdown and increased fall risk.   I have evaluated her as a K3 unlimited community ambulator.  Her activity level and professional duties requires a fully waterproof microprocessor knee and dynamic response foot to promote safety and allow for a more functional lifestyle.  The patient has adequate cardiovascular and pulmonary reserve for ambulation at faster than normal walking speed.  Her cognitive abilities are appropriate for this technology and there are no other known medical concerns that would interfere with maintaining this ability.  I am recommending that a replacement socket and socket inserts be ordered to maintain the patient's health, skin integrity, functional level, ability and stability. The patient has recently lost 40 pounds which translates to roughly 20 percent of her body weight through intentional exercise and weight loss medication. The residual limb has suffered severe atrophy causing the dramatic change in limb volume. The patient does more than simple walking and ambulates uneven terrain in her personal life.  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    These scenarios often include traversing salt water and adverse terrain.  The demands the patient will place on her prosthesis will be extreme and therefore the most resilient prosthetic componentry should be utilized. The patient current day to day  activities include caring for her 16 year old son, working full time as a nurse, cooking, cleaning shopping and generally living an independent life. The patients current componentry should also be checked to make sure they are in working order and are appropriate for the patients weight, functional needs and professional duties. Refer patient to [facility] Mary España for consultation.   Alert-The patient is alert, awake and responds to voice. The patient is oriented to time, place, and person. The triage nurse is able to obtain subjective information.

## 2025-03-07 ENCOUNTER — OFFICE VISIT (OUTPATIENT)
Dept: FAMILY MEDICINE CLINIC | Facility: CLINIC | Age: 40
End: 2025-03-07
Payer: COMMERCIAL

## 2025-03-07 VITALS
SYSTOLIC BLOOD PRESSURE: 136 MMHG | WEIGHT: 165.38 LBS | HEIGHT: 62 IN | BODY MASS INDEX: 30.44 KG/M2 | DIASTOLIC BLOOD PRESSURE: 76 MMHG

## 2025-03-07 DIAGNOSIS — Z78.9 IMPAIRED MOBILITY AND ADLS: ICD-10-CM

## 2025-03-07 DIAGNOSIS — Z74.09 IMPAIRED MOBILITY AND ADLS: ICD-10-CM

## 2025-03-07 DIAGNOSIS — F41.9 ANXIETY: ICD-10-CM

## 2025-03-07 DIAGNOSIS — S88.921S: Primary | ICD-10-CM

## 2025-03-07 DIAGNOSIS — I10 ESSENTIAL HYPERTENSION: ICD-10-CM

## 2025-03-07 DIAGNOSIS — F33.1 MAJOR DEPRESSIVE DISORDER, RECURRENT EPISODE, MODERATE (HCC): ICD-10-CM

## 2025-03-07 DIAGNOSIS — F90.2 ATTENTION DEFICIT HYPERACTIVITY DISORDER (ADHD), COMBINED TYPE: ICD-10-CM

## 2025-03-07 DIAGNOSIS — R26.81 GAIT INSTABILITY: ICD-10-CM

## 2025-03-07 DIAGNOSIS — J45.21 MILD INTERMITTENT ASTHMA WITH ACUTE EXACERBATION (HCC): ICD-10-CM

## 2025-03-07 PROCEDURE — 99214 OFFICE O/P EST MOD 30 MIN: CPT | Performed by: FAMILY MEDICINE

## 2025-03-07 RX ORDER — ALPRAZOLAM 1 MG/1
1 TABLET ORAL NIGHTLY PRN
Qty: 30 TABLET | Refills: 5 | Status: SHIPPED | OUTPATIENT
Start: 2025-03-07

## 2025-03-07 RX ORDER — DEXTROAMPHETAMINE SACCHARATE, AMPHETAMINE ASPARTATE, DEXTROAMPHETAMINE SULFATE AND AMPHETAMINE SULFATE 5; 5; 5; 5 MG/1; MG/1; MG/1; MG/1
20 TABLET ORAL DAILY
Qty: 30 TABLET | Refills: 0 | Status: SHIPPED | OUTPATIENT
Start: 2025-03-07 | End: 2025-04-06

## 2025-03-07 RX ORDER — DEXTROAMPHETAMINE SACCHARATE, AMPHETAMINE ASPARTATE MONOHYDRATE, DEXTROAMPHETAMINE SULFATE AND AMPHETAMINE SULFATE 5; 5; 5; 5 MG/1; MG/1; MG/1; MG/1
20 CAPSULE, EXTENDED RELEASE ORAL DAILY
Qty: 30 CAPSULE | Refills: 0 | Status: SHIPPED | OUTPATIENT
Start: 2025-05-06 | End: 2025-06-05

## 2025-03-07 RX ORDER — DEXTROAMPHETAMINE SACCHARATE, AMPHETAMINE ASPARTATE MONOHYDRATE, DEXTROAMPHETAMINE SULFATE AND AMPHETAMINE SULFATE 5; 5; 5; 5 MG/1; MG/1; MG/1; MG/1
20 CAPSULE, EXTENDED RELEASE ORAL DAILY
Qty: 30 CAPSULE | Refills: 0 | Status: SHIPPED | OUTPATIENT
Start: 2025-03-07 | End: 2025-04-06

## 2025-03-07 RX ORDER — VALACYCLOVIR HYDROCHLORIDE 1 G/1
2000 TABLET, FILM COATED ORAL EVERY 12 HOURS SCHEDULED
Qty: 16 TABLET | Refills: 1 | Status: SHIPPED | OUTPATIENT
Start: 2025-03-07

## 2025-03-07 RX ORDER — DEXTROAMPHETAMINE SACCHARATE, AMPHETAMINE ASPARTATE MONOHYDRATE, DEXTROAMPHETAMINE SULFATE AND AMPHETAMINE SULFATE 5; 5; 5; 5 MG/1; MG/1; MG/1; MG/1
20 CAPSULE, EXTENDED RELEASE ORAL DAILY
Qty: 30 CAPSULE | Refills: 0 | Status: SHIPPED | OUTPATIENT
Start: 2025-04-06 | End: 2025-05-06

## 2025-03-07 RX ORDER — DEXTROAMPHETAMINE SACCHARATE, AMPHETAMINE ASPARTATE, DEXTROAMPHETAMINE SULFATE AND AMPHETAMINE SULFATE 5; 5; 5; 5 MG/1; MG/1; MG/1; MG/1
20 TABLET ORAL DAILY
Qty: 30 TABLET | Refills: 0 | Status: SHIPPED | OUTPATIENT
Start: 2025-04-06 | End: 2025-05-06

## 2025-03-07 RX ORDER — DEXTROAMPHETAMINE SACCHARATE, AMPHETAMINE ASPARTATE, DEXTROAMPHETAMINE SULFATE AND AMPHETAMINE SULFATE 5; 5; 5; 5 MG/1; MG/1; MG/1; MG/1
20 TABLET ORAL DAILY
Qty: 30 TABLET | Refills: 0 | Status: SHIPPED | OUTPATIENT
Start: 2025-05-06 | End: 2025-06-05

## 2025-03-07 NOTE — ASSESSMENT & PLAN NOTE
BP shows borderline control with last BP of 136/76. Work on lifestyle changes, diet, exercise and weight management.   No results found for requested labs within last 1833 days 8 hours.

## 2025-03-07 NOTE — PROGRESS NOTES
.     The following individual(s) verbally consented to be recorded using ambient AI listening technology and understand that they can each withdraw their consent to this listening technology at any point by asking the clinician to turn off or pause the recording:    Patient name: Poly Mathis is currently utilizing a right AK permanent prosthesis in which she reports an ill fitting socket, which is causing instability and discomfort and is at risk at causing tissue breakdown and increased fall risk. I have evaluated her as a [employer] unlimited community ambulator. Her activity level and professional duties requires a fully waterproof microprocessor knee and dynamic response foot to promote safety and allow for a more functional lifestyle. The patient has adequate cardiovascular and pulmonary reserve for ambulation at faster than normal walking speed. Her cognitive abilities are appropriate for this technology and there are no other known medical concerns that would interfere with maintaining this ability. I am recommending that a replacement socket and socket inserts be ordered to maintain the patient's health, skin integrity, functional level, ability and stability. The patient has recently lost 40 pounds which translates to roughly 20 percent of her body weight through intentional exercise and weight loss medication. The residual limb has suffered severe atrophy causing the dramatic change in limb volume. The patient does more than simple walking and ambulates uneven terrain in her personal life. 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 These scenarios often include traversing salt water and adverse terrain. The demands the patient will place on her prosthesis will be extreme and therefore the most  resilient prosthetic componentry should be utilized. The patient current day to day activities include caring for her 16 year old son, working full time as a nurse, cooking, cleaning shopping and generally living an independent life. The patients current componentry should also be checked to make sure they are in working order and are appropriate for the patients weight, functional needs and professional duties. Refer patient to [facility] for consultation.

## 2025-03-07 NOTE — ASSESSMENT & PLAN NOTE
Orders:    Amphetamine-Dextroamphet ER; Take 1 capsule (20 mg total) by mouth daily.  Dispense: 30 capsule; Refill: 0    Amphetamine-Dextroamphet ER; Take 1 capsule (20 mg total) by mouth daily.  Dispense: 30 capsule; Refill: 0    Amphetamine-Dextroamphetamine; Take 1 tablet (20 mg total) by mouth daily.  Dispense: 30 tablet; Refill: 0    Amphetamine-Dextroamphetamine; Take 1 tablet (20 mg total) by mouth daily.  Dispense: 30 tablet; Refill: 0    Amphetamine-Dextroamphet ER; Take 1 capsule (20 mg total) by mouth daily.  Dispense: 30 capsule; Refill: 0    Amphetamine-Dextroamphetamine; Take 1 tablet (20 mg total) by mouth daily.  Dispense: 30 tablet; Refill: 0

## 2025-03-07 NOTE — PROGRESS NOTES
The following individual(s) verbally consented to be recorded using ambient AI listening technology and understand that they can each withdraw their consent to this listening technology at any point by asking the clinician to turn off or pause the recording:    Patient name: Poly Mathis

## 2025-03-07 NOTE — ASSESSMENT & PLAN NOTE
Orders:    ALPRAZolam; Take 1 tablet (1 mg total) by mouth nightly as needed for Anxiety.  Dispense: 30 tablet; Refill: 5

## 2025-03-07 NOTE — ASSESSMENT & PLAN NOTE
Secondary to deterioration of function with the residual limb prosthesis.  Patient in need of new prosthesis including socket, knee joint etc.  Patient requires upgrade to accommodate her active lifestyle, vocational, recreational etc.
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
## 2025-03-07 NOTE — ASSESSMENT & PLAN NOTE
Asthma (mild intermittent) is under Excellent control and Good compliance. Asthma rescue Meds: albuterol Aers - 108 (90 Base) MCG/ACT; albuterol Nebu - (2.5 MG/3ML) 0.083%

## 2025-03-12 ENCOUNTER — TELEPHONE (OUTPATIENT)
Dept: FAMILY MEDICINE CLINIC | Facility: CLINIC | Age: 40
End: 2025-03-12

## 2025-03-12 NOTE — TELEPHONE ENCOUNTER
Patient saw Dejan on 3/7/25.  He was to fill out form for [facility] and have it faxed over.  Patient is not seeing that it was completed in [facility].    [facility]  [phone] Fax   Patent

## 2025-03-12 NOTE — TELEPHONE ENCOUNTER
[place] [facility]    [facility]    Prosthetic Equipment  Fax number: [phone]    Diagnosis: Z89.611    Notes from Dr. Hunter M.D. included

## 2025-03-18 ENCOUNTER — TELEPHONE (OUTPATIENT)
Dept: FAMILY MEDICINE CLINIC | Facility: CLINIC | Age: 40
End: 2025-03-18

## 2025-03-18 NOTE — TELEPHONE ENCOUNTER
Jazmin from [facility] called wanting to confirm if the script for ALPRAZolam 1 MG Oral Tab ordered on 3/7/25 is in addition to the 0.5 she is taking .      Jazmin [phone]    [id number] [address] AT [facility] [address]  & , [phone], [phone]

## 2025-03-18 NOTE — TELEPHONE ENCOUNTER
No, the 1 mg REPLACED the 0.5 mg and was changed in our system to refelct only 1 mg going forward. Thanks and stay well, Jesus Harrison MD

## 2025-03-19 NOTE — TELEPHONE ENCOUNTER
Spoke with Leighann and Jazmin at [facility] letting them know going forward the order is for Alprazolam 1 mg only. Alprazolam 0.5mg was discontinued per .

## (undated) NOTE — Clinical Note
ASTHMA ACTION PLAN for Bijal Magana     : 6/3/1985     Date: 5/10/2017  Provider:  Leslie Calhoun MD  Phone for doctor or clinic: [facility], [address] [facility], [address] [address] [address] [place] [address] [place], [place] [id number]
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
## (undated) NOTE — IP AVS SNAPSHOT
[address]            (For Outpatient Use Only) Initial Admit Date: 7/11/2017   Inpt/Obs Admit Date: Inpt: 7/11/17 / Obs: N/A   Discharge Date:    Hospital Acct:  [de-identified]   MRN: [de-identified]   CSN: [id number]        ENCOUNTER  Patient Subscriber Name:  Vamsi Mckeon :    Subscriber ID:  Pt Rel to Subscriber:    Hospital Account Financial Class[place] [facility]    July 15, 2017

## (undated) NOTE — MR AVS SNAPSHOT
[address] [facility] [address]  [facility],  [address]  [address] [place] [address] [phone]               Thank you for choosing us for your health care visit with Alexandra Olivares PA-C.   We are glad to serve you and happy to provide you with [facility] Reason for Today's Visit     Sinus Problem     Chest Congestion     Anxiety     ADD           Medical Issues Discussed Today     Mild intermittent asthma with acute exacerbation    SOB (shortness of breath)        Cough        Acute non-recurrent pansinusi medications prescribed for you. Read the directions carefully, and ask your doctor or other care provider to review them with you.          Where to Get Your Medications      These medications were sent to [place] [address] HOW TO GET STARTED: HOW TO STAY MOTIVATED:   Start activities slowly and build up over time Do what you like   Get your heart pumping – brisk walking, biking, swimming Even 10 minute increments are effective and add up over the week   2 ½ hours per week –

## (undated) NOTE — MR AVS SNAPSHOT
[address] [facility] [address]  [facility],  [address]18 [facility] [phone]               Thank you for choosing us for your health care visit with Jorge Luis Pitts PA-C.   We are glad to serve you and happy to provide you with [facility] To reduce your risk of falling:  · Get out of bed or up from a chair slowly. · Wear low-heeled shoes that fit properly and have slip-resistant soles. · Remove throw rugs. Clear clutter from walkways. · Use handrails on stairs.  Have handrails installed o This list is accurate as of: 6/7/17  3:03 PM.  Always use your most recent med list.                Albuterol Sulfate  (90 Base) MCG/ACT Aers   Inhale 2 puffs into the lungs every 4 (four) hours as needed for Wheezing or Shortness of Breath.    Commo triamcinolone acetonide 0.1 % Crea   Apply topically 2 (two) times daily as needed. Commonly known as:  KENALOG           * Notice: This list has 5 medication(s) that are the same as other medications prescribed for you.  Read the directions care

## (undated) NOTE — LETTER
Date: 11/30/2020    Patient Name: Arminda Jimenez    Fax to: [address] [facility] [phone]      To Whom it may concern:     This letter has been written at the patient's request. The above patient was seen at the [facility] f

## (undated) NOTE — Clinical Note
Date: 6/7/2017    Patient Name: Rayna Hfuf          To Whom it may concern: This letter has been written at the patient's request. The above patient was seen at the [facility] for treatment of a medical condition.     Lillian orellana

## (undated) NOTE — Clinical Note
[address] [facility], 117 [facility], [address] [address] [address], [address] [place], [place] [id number]

## (undated) NOTE — LETTER
Date: 11/17/2017    Patient Name: Samson Landeros          To Whom it may concern:    Samson Landeros is under my care and is able to drive safely.     Thank you,       Graciela Krishnan MD, 11/17/2017, 12:49 PM

## (undated) NOTE — MR AVS SNAPSHOT
[facility] [place],  [address]  [address] [place] [address] [phone]               Thank you for choosing us for your health care visit with Mello Javier DO.   We are glad to serve you and happy to provide you with this sum FLUoxetine HCl 10 MG Caps   Take 1 capsule (10 mg total) by mouth every morning. Commonly known as:  PROZAC           Levonorgest-Eth Estrad 91-Day 0.15-0.03 MG Tabs   Take 1 tablet by mouth daily.    Commonly known as:  Lindsay Armstrong

## (undated) NOTE — LETTER
2019   Name: Nichole Elliott    : 6/3/1985      Re: Lucrecia Edgar the Knee Prosthetic Socket    Nichole Elliott is wearing a prosthetic socket that she received in 2018.   Since delivery, her limb volume has changed and she has improved her fun

## (undated) NOTE — LETTER
[address], [address] [place], [place] [address]y  [phone]          Letter of Medical Necessity   5/8/2020    Patient Name:  Jing Cooper  YOB: 1985  Insurance: [employer] out and is in need of replacement as well. 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   Also because of the ill fitting of her prosthesis she has ha

## (undated) NOTE — LETTER
Date: 11/17/2017    Patient Name: Gagan Villeda          To Whom it may concern:    Gagan Villeda is under my care. She has been recuperating well, but is unable to work at this time.  She will be able to return to work on January 3, 2018 without re

## (undated) NOTE — ED AVS SNAPSHOT
[facility] [address] [address]    Phone:  [phone]    Fax:  [phone]           Mrs. Luis Mancini   MRN: [id number]    Department:  [facility]   Date of V have any questions regarding your home medications, including potential side effects.               Medication List      START taking these medications     Fluticasone Propionate 50 MCG/ACT Susp   Quantity:  16 g   Commonly known as:  FLONASE   2 sprays by Expect to receive an electronic request (by e-mail or text) to complete a self-assessment the day after your visit. You may also receive a call from our patient liason soon after your visit.  Also, some patients receive a detailed feedback survey mailed to [place] [id number]  ([address]) [address] [facility] [phone] [address] ([address] [place] [address]) [phone] [address] [place] [address] [address] ( TECHNIQUE:  PA and lateral chest radiographs were obtained. PATIENT STATED HISTORY: (As transcribed by Technologist)  Dizziness and nausea. History of asthma exacerbation. FINDINGS:  Normal heart size and pulmonary vascularity.  No pleural effusi

## (undated) NOTE — LETTER
Date: 4/1/2019    Patient Name: Samson Landeros          To Whom it may concern:    Samson Landeros is under my care. 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   She currently has an above-the-knee ampu

## (undated) NOTE — Clinical Note
[facility], [address] [facility], [address] [place] [address] [address] [phone]        [facility]  Stimulant Agreement    The purpose of this Agreement is to prevent misunderstandings about certain medi Self-medicate with legal controlled substances not prescribed to me. I promise to let my physician know of any medications prescribed to me by another  provider after  the date of this agreement.  Use of alcohol is contraindicated; if I  drink alcohol, I p Date   Time   Signature of Patient or Authorized Representative (with relationship to the Patient)  The Patient/Authorized Representative has read this form or had it read to him/her, states that he/she understands this information and has no further quest

## (undated) NOTE — LETTER
ASTHMA ACTION PLAN for Luis Mancini     : 6/3/1985     Date: 2018  Provider:  Noemy Wilcox MD  Phone for doctor or clinic: 1135 [facility], [facility], [address] [address] [address], [address]  [address] [place] [address]

## (undated) NOTE — LETTER
Date: 1/9/2019    Patient Name: Manjit Murguia          To Whom it may concern:    Manjit Murguia is under my care. She had a normal Chest XRay (Pa and Lateral views) on 6/3/2017 so she does not need TB testing until 6/3/2022.     Thank you,       Teresita Guzman